# Patient Record
Sex: FEMALE | Race: BLACK OR AFRICAN AMERICAN | NOT HISPANIC OR LATINO | Employment: OTHER | URBAN - METROPOLITAN AREA
[De-identification: names, ages, dates, MRNs, and addresses within clinical notes are randomized per-mention and may not be internally consistent; named-entity substitution may affect disease eponyms.]

---

## 2017-01-11 ENCOUNTER — HOSPITAL ENCOUNTER (OUTPATIENT)
Dept: MRI IMAGING | Facility: CLINIC | Age: 67
Discharge: HOME | End: 2017-01-11
Attending: PSYCHIATRY & NEUROLOGY | Admitting: PSYCHIATRY & NEUROLOGY
Payer: COMMERCIAL

## 2017-01-11 DIAGNOSIS — Z86.73 HISTORY OF STROKE: ICD-10-CM

## 2017-01-11 DIAGNOSIS — G20.C PARKINSONISM, UNSPECIFIED PARKINSONISM TYPE (H): ICD-10-CM

## 2017-01-11 DIAGNOSIS — R26.9 GAIT DISORDER: ICD-10-CM

## 2017-01-11 PROCEDURE — 70551 MRI BRAIN STEM W/O DYE: CPT

## 2017-01-17 ENCOUNTER — OFFICE VISIT (OUTPATIENT)
Dept: NEUROPSYCHOLOGY | Facility: CLINIC | Age: 67
End: 2017-01-17

## 2017-01-17 DIAGNOSIS — G20.C PARKINSONISM, UNSPECIFIED PARKINSONISM TYPE (H): Primary | ICD-10-CM

## 2017-01-17 DIAGNOSIS — F09 MENTAL DISORDER DUE TO GENERAL MEDICAL CONDITION: ICD-10-CM

## 2017-01-17 NOTE — PROGRESS NOTES
The patient was seen for neuropsychological evalution at the request of Dr. Jung Barrera for the purposes of diagnostic clarification and treatment planning. 3 hours of face-to-face testing were provided by this writer. Please see Dr. Faviola Osuna's report for a full interpretation of the findings.

## 2017-02-02 DIAGNOSIS — R41.3 MEMORY LOSS: Primary | ICD-10-CM

## 2017-02-02 DIAGNOSIS — F32.A DEPRESSION, UNSPECIFIED DEPRESSION TYPE: ICD-10-CM

## 2017-02-02 PROBLEM — F09 COGNITIVE DISORDER: Status: ACTIVE | Noted: 2017-02-02
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
## 2017-02-07 NOTE — PROGRESS NOTES
WAIS-IV [place] NAMING TEST     Raw              Age Scaled  Score     46      MAS      5      Vocabulary   33     9  [      45    w/o cues   10    w/phonemic cues]   Block Design        20     6     Coding   17     3  CONTROLLED ORAL WORD ASSOC TEST   Digit Span   24     9  Score     17             MAS     4            SEMANTIC FLUENCY   WIDE RANGE ACHIEVEMENT TEST - 4 Score     28              MAS     6          Standard  %tile               Grade        Score  Rank                Equiv TRAIL MAKING TEST   Reading    95      37           11.6          Seconds               Errors           MAS                   A      59.07   0   5       WECHSLER MEMORY SCALE - REVISED  B    366.06   4   2      Raw Score        MAS           Information & Orientation        11  STROOP   Logical Memory  Immed.   14     5                      Raw   +  Tali =     Total     MAS         Logical Memory  30 min.     8     5   Word  58  +   - =     58      3     Visual Reprod Imm.   18     3   Color  40  +   - =     40     3     Visual Reprod 30 min.     2     2   C-W  16  +   - =     16      3     30 Minute Recognition     1      [place] CARD SORTING TEST   GOLD AUDITORY VERBAL LEARNING TEST # Categories  2            11-16  %ile   (30 item recognition) % persev err.       12         T        43         23 %ile     I   II III  IV  V VI  VII       3   4        4            5         5       3            2 GAMBOA JUDGMENT OF LINE ORIENTATION    Form  H    30 Minute Recall  1  MAS   5  Raw Score         15          MAS          7   30 Minute Recognition         5         MAS   2            Intrusions  3  DEMENTIA RATING SCALE - 2       Learning Efficiency      <65  % Ret.      20   Raw MAS Raw     MAS                                                                      MAS        5 Attention  37    13  Concept   35     8    GOLD COMPLEX FIGURE TEST Init/Psv  31     6  Memory   16     2     Raw Score   T-score %tile Construct     6   10  Total  125/144    5    Copy   28      --        2-5      GERIATRIC DEPRESSION SCALE = 16   [facility] = [place] Older Adult Normative Study Age Adj. Scaled Score

## 2017-02-27 ENCOUNTER — TELEPHONE (OUTPATIENT)
Dept: NEUROLOGY | Facility: CLINIC | Age: 67
End: 2017-02-27

## 2017-02-27 NOTE — TELEPHONE ENCOUNTER
"Called the Psychiatry clinic ([phone]) to discuss how to get this patient seen in their clinic. Referral was placed back on 12/6/16 and 2/2/17 and it does not appear that the patient has been contacted. Spoke to \"Kassy\". On 12/8 the psychiatry clinic LVM on patient's phone to call them back. Patient has not followed up. Nurse Practitioners can see patient within 2-3 weeks, residents are booking out 3 months.    Called patient and LVM to call me back to discuss the referrals Dr. Barrera placed when he saw her in December.      "

## 2017-03-27 ENCOUNTER — OFFICE VISIT (OUTPATIENT)
Dept: NEUROLOGY | Facility: CLINIC | Age: 67
End: 2017-03-27

## 2017-03-27 VITALS
DIASTOLIC BLOOD PRESSURE: 76 MMHG | RESPIRATION RATE: 16 BRPM | WEIGHT: 174 LBS | SYSTOLIC BLOOD PRESSURE: 154 MMHG | HEIGHT: 64 IN | OXYGEN SATURATION: 96 % | BODY MASS INDEX: 29.71 KG/M2 | HEART RATE: 58 BPM

## 2017-03-27 DIAGNOSIS — F03.A0 MILD DEMENTIA (H): ICD-10-CM

## 2017-03-27 DIAGNOSIS — R26.89 BALANCE PROBLEMS: ICD-10-CM

## 2017-03-27 DIAGNOSIS — R26.9 GAIT DIFFICULTY: Primary | ICD-10-CM

## 2017-03-27 RX ORDER — DONEPEZIL HYDROCHLORIDE 5 MG/1
TABLET, FILM COATED ORAL
Qty: 60 TABLET | Refills: 11 | Status: SHIPPED | OUTPATIENT
Start: 2017-03-27 | End: 2017-09-01 | Stop reason: DRUGHIGH

## 2017-03-27 ASSESSMENT — PAIN SCALES - GENERAL: PAINLEVEL: EXTREME PAIN (8)

## 2017-03-27 NOTE — MR AVS SNAPSHOT
After Visit Summary   3/27/2017    Hannah Choudhary    MRN: [id number]           Patient Information     Date Of Birth          1950        Visit Information        Provider Department      3/27/2017 11:40 AM Grey Martinez APRN CNP [facility] Neurology        Today's Diagnoses     Gait difficulty    -  1    Balance problems        Mild dementia          Care Instructions    March 27, 2017    Dear Ms. Hannah Choudhary,    Thank you for coming today.  During your visit, we have discussed the following:     __  Occupational Therapy to do Home assessment for safety & possible grab bars.   __  Consider psychiatry & psychology care.  Per your choice, will not put a referral.   __  Your dopamine scan was NORMAL.  It didn't show Parkinsonian Condition.   __  Your Brain MRI was unchanged.  No abnormalities.   __  Your Neuropsychological evaluation shows dementia.   __  For dementia, start taking Aricept 5 mg 1 tablet daily for 1 month, then 2 tablets.  Monitor for stomach problems like nausea, diarrhea, or vomiting.   __  Return in 4 - 5 months. You may return sooner as needed.      For questions, you may send us a [employer] message or call [phone]    Fax number: [phone]    GREG Chaney CNP  [facility]         Follow-ups after your visit        Additional Services     OCCUPATIONAL THERAPY REFERRAL       House safety assessment.                  Your next 10 appointments already scheduled     Aug 29, 2017 11:15 AM CDT   (Arrive by 11:00 AM)   Return Movement Disorder with GREG Alvarado CNP   [facility] Neurology ([facility])    [address] [place] [address] [phone]              Who to contact     Please call your clinic at [phone] to:    Ask questions about your health    Make or cancel appointments    Discuss your medicines    Learn about your test results    Speak to your doctor   If you have  "compliments or concerns about an experience at your clinic, or if you wish to file a complaint, please contact [facility] Patient Relations at [phone] or email us at [email or web address]         Additional Information About Your Visit        [employer] Information     Comviva is an electronic gateway that provides easy, online access to your medical records. With Comviva, you can request a clinic appointment, read your test results, renew a prescription or communicate with your care team.     To sign up for Comviva visit the website at [email or web address]   You will be asked to enter the access code listed below, as well as some personal information. Please follow the directions to create your username and password.     Your access code is: [email or web address]  Expires: 2017  7:31 AM     Your access code will  in 90 days. If you need help or a new code, please contact your [facility] or call [phone] for assistance.        Care EveryWhere ID     This is your Care EveryWhere ID. This could be used by other organizations to access your [place] medical records  [id number]        Your Vitals Were     Pulse Respirations Height Pulse Oximetry Breastfeeding? BMI (Body Mass Index)    58 16 1.626 m (5' 4\") 96% No 29.87 kg/m2       Blood Pressure from Last 3 Encounters:   17 154/76   16 152/79    Weight from Last 3 Encounters:   17 78.9 kg (174 lb)              We Performed the Following     OCCUPATIONAL THERAPY REFERRAL          Today's Medication Changes          These changes are accurate as of: 3/27/17 12:44 PM.  If you have any questions, ask your nurse or doctor.               Start taking these medicines.        Dose/Directions    donepezil 5 MG tablet   Commonly known as:  ARICEPT   Used for:  Mild dementia   Started by:  Grey Martinez APRN CNP        Take 5 mg/day -- 1 tablet daily for 1 month, then " increase dose 10 mg/day (2 tablets) -   Quantity:  60 tablet   Refills:  11            Where to get your medicines      These medications were sent to [facility] - [place], [place] - [address] AT [facility] & [address]  [address], [facility] [address]     Phone:  [phone]     donepezil 5 MG tablet                Primary Care Provider Office Phone # Fax #    Niurka MASSEY Cameron [phone]       [facility] [address] [facility] [address]        Thank you!     Thank you for choosing [facility] NEUROLOGY  for your care. Our goal is always to provide you with excellent care. Hearing back from our patients is one way we can continue to improve our services. Please take a few minutes to complete the written survey that you may receive in the mail after your visit with us. Thank you!             Your Updated Medication List - Protect others around you: Learn how to safely use, store and throw away your medicines at www.disposemymeds.org.          This list is accurate as of: 3/27/17 12:44 PM.  Always use your most recent med list.                   Brand Name Dispense Instructions for use    acetaminophen 500 MG tablet    TYLENOL         * albuterol (2.5 MG/3ML) 0.083% neb solution     360 mL    Take 1 vial (2.5 mg) by nebulization every 6 hours as needed for shortness of breath / dyspnea or wheezing       * albuterol 108 (90 BASE) MCG/ACT Inhaler   Generic drug:  albuterol      Inhale 2 puffs into the lungs every 6 hours       aspirin 81 MG chewable tablet     108 tablet    Take 1 tablet (81 mg) by mouth daily       benzoyl peroxide 10 % topical gel      Apply topically daily       * TRUEPLUS LANCETS 28G Misc      1 each       * blood glucose monitoring lancets          cetirizine 10 MG tablet    zyrTEC    30 tablet    Take 1 tablet (10 mg) by mouth every evening       clonazePAM 1 MG tablet    klonoPIN    20 tablet    Take 1 tablet (1  mg) by mouth 2 times daily as needed for anxiety       COMPRESSION STOCKINGS          donepezil 5 MG tablet    ARICEPT    60 tablet    Take 5 mg/day -- 1 tablet daily for 1 month, then increase dose 10 mg/day (2 tablets) -       GLUCAGON EMERGENCY 1 MG kit   Generic drug:  glucagon      1 mg       HYDROcodone-acetaminophen 7.5-325 MG per tablet    NORCO    20 tablet    maximum  tablet(s) per day       lidocaine 5 % Patch    LIDODERM    30 patch    Place 1 patch onto the skin every 24 hours       lisinopril 10 MG tablet    PRINIVIL/ZESTRIL    30 tablet    Take 1 tablet (10 mg) by mouth daily       metFORMIN 500 MG tablet    GLUCOPHAGE    60 tablet    Take 1 tablet (500 mg) by mouth daily (with breakfast)       MEVACOR 20 MG tablet   Generic drug:  lovastatin     30 tablet    Take 1 tablet (20 mg) by mouth At Bedtime       * NASONEX 50 MCG/ACT spray   Generic drug:  mometasone     1 Box    Spray 2 sprays into both nostrils daily       * NASONEX 50 MCG/ACT spray   Generic drug:  mometasone     1 Box    Spray 2 sprays into both nostrils daily       PARoxetine 20 MG tablet    PAXIL    30 tablet    1 in the morning and 2 in the evening       polyethylene glycol powder    MIRALAX/GLYCOLAX     Take 17 g by mouth       ranitidine 150 MG tablet    ZANTAC    60 tablet    Take 1 tablet (150 mg) by mouth 2 times daily       senna 8.6 MG tablet    SENOKOT    120 tablet    Take 3 tablets by mouth At Bedtime       traZODone 50 MG tablet    DESYREL    90 tablet    Take 1 tablet (50 mg) by mouth nightly as needed for sleep       TRUE METRIX AIR GLUCOSE METER W/DEVICE Kit          verapamil 240 MG CR tablet    CALAN-SR    30 tablet    Take 1 tablet (240 mg) by mouth daily       * Notice:  This list has 6 medication(s) that are the same as other medications prescribed for you. Read the directions carefully, and ask your doctor or other care provider to review them with you.

## 2017-03-27 NOTE — NURSING NOTE
Chief Complaint   Patient presents with     Consult     [facility]- MOVEMENT DISORDER, SEEN BY MARTIN BEFORE

## 2017-03-27 NOTE — PATIENT INSTRUCTIONS
March 27, 2017    Dear Ms. Hannah CUELLAR Kenrick,    Thank you for coming today.  During your visit, we have discussed the following:     __  Occupational Therapy to do Home assessment for safety & possible grab bars.   __  Consider psychiatry & psychology care.  Per your choice, will not put a referral.   __  Your dopamine scan was NORMAL.  It didn't show Parkinsonian Condition.   __  Your Brain MRI was unchanged.  No abnormalities.   __  Your Neuropsychological evaluation shows dementia.   __  For dementia, start taking Aricept 5 mg 1 tablet daily for 1 month, then 2 tablets.  Monitor for stomach problems like nausea, diarrhea, or vomiting.   __  Return in 4 - 5 months. You may return sooner as needed.      For questions, you may send us a [employer] message or call [phone]    Fax number: [phone]    GREG Chaney, CNP  [facility]

## 2017-08-29 ENCOUNTER — ALLIED HEALTH/NURSE VISIT (OUTPATIENT)
Dept: NEUROLOGY | Facility: CLINIC | Age: 67
End: 2017-08-29

## 2017-08-29 ENCOUNTER — OFFICE VISIT (OUTPATIENT)
Dept: NEUROLOGY | Facility: CLINIC | Age: 67
End: 2017-08-29

## 2017-08-29 ENCOUNTER — TELEPHONE (OUTPATIENT)
Dept: NEUROLOGY | Facility: CLINIC | Age: 67
End: 2017-08-29

## 2017-08-29 VITALS
RESPIRATION RATE: 20 BRPM | SYSTOLIC BLOOD PRESSURE: 134 MMHG | HEART RATE: 55 BPM | HEIGHT: 64 IN | WEIGHT: 165.8 LBS | DIASTOLIC BLOOD PRESSURE: 75 MMHG | BODY MASS INDEX: 28.31 KG/M2 | TEMPERATURE: 98.1 F | OXYGEN SATURATION: 96 %

## 2017-08-29 DIAGNOSIS — R26.9 GAIT DIFFICULTY: Primary | ICD-10-CM

## 2017-08-29 DIAGNOSIS — F03.A0 MILD DEMENTIA (H): ICD-10-CM

## 2017-08-29 DIAGNOSIS — Z71.9 VISIT FOR COUNSELING: Primary | ICD-10-CM

## 2017-08-29 DIAGNOSIS — R26.89 BALANCE PROBLEMS: ICD-10-CM

## 2017-08-29 ASSESSMENT — PAIN SCALES - GENERAL: PAINLEVEL: EXTREME PAIN (8)

## 2017-08-29 NOTE — TELEPHONE ENCOUNTER
Called Mr. Choudhary's daughter, Gaby to update her.  She is aware that her mother has dementia.  She is in the process of taking over paying her mother's bills & having access to her SS money.  Once this is set up, she plans to have her mother's groceries deliver to the house & consider meals on wheels . . .    She was given contact information on Felicitas Lopez, .  She'll fax her Health care directives.

## 2017-08-29 NOTE — MR AVS SNAPSHOT
After Visit Summary   2017    Hannah Choudhary    MRN: [id number]           Patient Information     Date Of Birth          1950        Visit Information        Provider Department      2017 1:39 PM Felicitas Lopez LICSW M [facility]        Today's Diagnoses     Visit for counseling    -  1       Follow-ups after your visit        Your next 10 appointments already scheduled     Mar 06, 2018 11:15 AM CST   (Arrive by 11:00 AM)   Return Movement Disorder with GREG Alvarado CNP [facility] ([facility] [place])    [address] [place] [address] [phone]              Who to contact     Please call your clinic at [phone] to:    Ask questions about your health    Make or cancel appointments    Discuss your medicines    Learn about your test results    Speak to your doctor   If you have compliments or concerns about an experience at your clinic, or if you wish to file a complaint, please contact [facility] Patient Relations at [phone] or email us at [email or web address]         Additional Information About Your Visit        MyChart Information     Dobleast is an electronic gateway that provides easy, online access to your medical records. With [employer], you can request a clinic appointment, read your test results, renew a prescription or communicate with your care team.     To sign up for Dobleast visit the website at [email or web address]   You will be asked to enter the access code listed below, as well as some personal information. Please follow the directions to create your username and password.     Your access code is: [email or web address]  Expires: 2017  6:30 AM     Your access code will  in 90 days. If you need help or a new code, please contact your [facility] or call [phone] for assistance.        Care EveryWhere ID      This is your Care EveryWhere ID. This could be used by other organizations to access your [place] medical records  [id number]         Blood Pressure from Last 3 Encounters:   08/29/17 134/75   03/27/17 154/76   12/06/16 152/79    Weight from Last 3 Encounters:   08/29/17 75.2 kg (165 lb 12.8 oz)   03/27/17 78.9 kg (174 lb)              Today, you had the following     No orders found for display       Primary Care Provider Office Phone # Fax #    Niurka Barnes [phone]       [facility] [address] [facility] [address]        Equal Access to Services     [facility] Hospital[facility] : Hadii ozhaib choi haddominiko Sokristie, waaxda luqadaha, qaybta kaalmada adeyamilayada, yung cruz . So [facility] [phone].    ATENCIÓN: Si habla español, tiene a harris disposición servicios gratuitos de asistencia lingüística. Llame al [phone].    We comply with applicable federal civil rights laws and [place] laws. We do not discriminate on the basis of race, color, national origin, age, disability sex, sexual orientation or gender identity.            Thank you!     Thank you for choosing [facility] NEUROLOGY  for your care. Our goal is always to provide you with excellent care. Hearing back from our patients is one way we can continue to improve our services. Please take a few minutes to complete the written survey that you may receive in the mail after your visit with us. Thank you!             Your Updated Medication List - Protect others around you: Learn how to safely use, store and throw away your medicines at www.disposemymeds.org.          This list is accurate as of: 8/29/17  1:58 PM.  Always use your most recent med list.                   Brand Name Dispense Instructions for use Diagnosis    acetaminophen 500 MG tablet    TYLENOL          * albuterol (2.5 MG/3ML) 0.083% neb solution     360 mL    Take 1 vial (2.5 mg) by nebulization every 6 hours as needed for shortness of  breath / dyspnea or wheezing        * PROAIR  (90 BASE) MCG/ACT Inhaler   Generic drug:  albuterol      Inhale 2 puffs into the lungs every 6 hours        aspirin 81 MG chewable tablet     108 tablet    Take 1 tablet (81 mg) by mouth daily        benzoyl peroxide 10 % topical gel      Apply topically daily        * TRUEPLUS LANCETS 28G Misc      1 each        * blood glucose monitoring lancets           cetirizine 10 MG tablet    zyrTEC    30 tablet    Take 1 tablet (10 mg) by mouth every evening        clonazePAM 1 MG tablet    klonoPIN    20 tablet    Take 1 tablet (1 mg) by mouth 2 times daily as needed for anxiety        COMPRESSION STOCKINGS           donepezil 5 MG tablet    ARICEPT    60 tablet    Take 5 mg/day -- 1 tablet daily for 1 month, then increase dose 10 mg/day (2 tablets) -    Mild dementia       GLUCAGON EMERGENCY 1 MG kit   Generic drug:  glucagon      1 mg        HYDROcodone-acetaminophen 7.5-325 MG per tablet    NORCO    20 tablet    maximum  tablet(s) per day        lidocaine 5 % Patch    LIDODERM    30 patch    Place 1 patch onto the skin every 24 hours        lisinopril 10 MG tablet    PRINIVIL/ZESTRIL    30 tablet    Take 1 tablet (10 mg) by mouth daily        metFORMIN 500 MG tablet    GLUCOPHAGE    60 tablet    Take 1 tablet (500 mg) by mouth daily (with breakfast)        MEVACOR 20 MG tablet   Generic drug:  lovastatin     30 tablet    Take 1 tablet (20 mg) by mouth At Bedtime        * NASONEX 50 MCG/ACT spray   Generic drug:  mometasone     1 Box    Spray 2 sprays into both nostrils daily        * NASONEX 50 MCG/ACT spray   Generic drug:  mometasone     1 Box    Spray 2 sprays into both nostrils daily        PARoxetine 20 MG tablet    PAXIL    30 tablet    1 in the morning and 2 in the evening        polyethylene glycol powder    MIRALAX/GLYCOLAX     Take 17 g by mouth        ranitidine 150 MG tablet    ZANTAC    60 tablet    Take 1 tablet (150 mg) by mouth 2 times daily         senna 8.6 MG tablet    SENOKOT    120 tablet    Take 3 tablets by mouth At Bedtime        traZODone 50 MG tablet    DESYREL    90 tablet    Take 1 tablet (50 mg) by mouth nightly as needed for sleep        TRUE METRIX AIR GLUCOSE METER W/DEVICE Kit           verapamil 240 MG CR tablet    CALAN-SR    30 tablet    Take 1 tablet (240 mg) by mouth daily        * Notice:  This list has 6 medication(s) that are the same as other medications prescribed for you. Read the directions carefully, and ask your doctor or other care provider to review them with you.

## 2017-08-29 NOTE — PROGRESS NOTES
"PATIENT: Hannah Choudhary    : 1950    LIZZIE: 2017    REASON FOR VISIT: Follow up for dementia after Aricept trial.     HPI: Ms. Hannah Choudhary is a 67 year old  female who came to the [facility] neurology clinic by herself for a follow up visit.  I saw her last in the clinic on 3/27/2017 to discuss her workup.  During that visit, the following were discussed: -    ASSESSMENT AND PLAN:        1.  Gait and balance problems.  Patient has a history of gait and balance problems with a possible diagnosis of parkinsonism since 9044-2256.         __  Her DaTscan scan was reviewed, which showed normal bilateral dopamine uptake in the striate.  No dopaminergic deficit was shown which shows no parkinsonian condition.  This was discussed with the patient.  She was surprised & reports \"you are the first one who told me this.\"  Records show that the providers that she had seen at [facility] had told her that she may not have parkinsonian condition, which she wasn't mckinley to recall.   __  Her MRI result was discussed with patient, which did not show any acute intracranial abnormality.  She has some chronic small vessel ischemic disease which is stable compared to 2015 MRI.   __  Since she is at risk of falls, I recommended OT home evaluation and assessment to see if she could get more grab bars to prevent falls which she agreed.  Referral was placed in Epic.   __  Discussed about physical therapy to work on gait and balance.  She declined as she is careful when walking and likes to walk at [facility].       2.  Mild dementia.  Her neuropsych evaluation was reviewed with the patient which showed mild dementia and a global cerebral involvement.  It also indicated that she is experiencing moderate  depression.        __  There is a recommendation to involve Psychiatry and Psychology, which was discussed with the patient.  She declined any help from Psychiatry or Psychology.  \"I know my problems & stressors that " "depresses me.\"   She reports it would not do anything to talk about it with somebody else.  She reports getting support from her 1st .  She is on antidepressants which is managed by her primary care provider.   __  Discussed about starting treatment for dementia.  I recommended starting Aricept.  After reviewing benefits and side effects, patient was a little leery about diarrhea.  However, since this is dose dependent, she opted to try it.                            A prescription for Aricept was sent for 5 mg 1 tablet daily for 1 month and 10 mg (2 tablets) daily.                            She was encouraged to monitor for GI symptoms.     Today, when asked if she started taking Aricept, she said, \"I'm not sure. I don't remember.\"  To get the information, she first called her PCP clinic to ask for her medication list.  When she was told that Aricept was prescribed from this clinic & is on her list, she then called her pharmacist to ask if she has picked up the Rx. She was told that she has picked it up.  She doesn't recall if she is taking Aricept 5 mg 1 or 2 tables per day.  She also doesn't recall if she is taking it in the morning or evening.  She asked if I know the code on the pill bottle for Aricept, if it's \"M\" or \"G\".  She reports that the code helps her to figure out if she's taking it once or twice.  She called her nurse who sets up her pills who told her that she takes Aricept 10 mg daily.  She denies side effects.       She reports having low back pain & pain in her knees & feet, which affect her walking.  She is taking Vicodin 7.5 mg 4 x daily for pain, but it's not helping her.  She had 2 falls since her last visit, which exacerbates her pain.  She lives by herself in a senior living apartment.  She reports difficulty cleaning her house.  Her daughter came & helped her clean.  She needs help sorting out her mails & paying bills.  She doesn't have a  PCA.  She has had PCAs come to her house in " the past, but she reports that they stole her pills.  She has alternative care through [facility].  She doesn't want to go to assisted living.  She wants to get help, but is concerned about PCA's not doing what they're supposed to do.     MEDICATIONS:   Medication Sig     donepezil (ARICEPT) 10 MG tablet Take 1 tablet daily.     albuterol (2.5 MG/3ML) 0.083% neb solution Take 1 vial (2.5 mg) by nebulization every 6 hours as needed for shortness of breath / dyspnea or wheezing     albuterol (ALBUTEROL) 108 (90 BASE) MCG/ACT Inhaler Inhale 2 puffs into the lungs every 6 hours     aspirin 81 MG chewable tablet Take 1 tablet (81 mg) by mouth daily     cetirizine (ZYRTEC) 10 MG tablet Take 1 tablet (10 mg) by mouth every evening     lisinopril (PRINIVIL/ZESTRIL) 10 MG tablet Take 1 tablet (10 mg) by mouth daily     lovastatin (MEVACOR) 20 MG tablet Take 1 tablet (20 mg) by mouth At Bedtime     mometasone (NASONEX) 50 MCG/ACT spray Spray 2 sprays into both nostrils daily     ranitidine (ZANTAC) 150 MG tablet Take 1 tablet (150 mg) by mouth 2 times daily     traZODone (DESYREL) 50 MG tablet Take 1 tablet (50 mg) by mouth nightly as needed for sleep     verapamil (CALAN-SR) 240 MG CR tablet Take 1 tablet (240 mg) by mouth daily     COMPRESSION STOCKINGS      blood glucose monitoring (ACCU-CHEK FASTCLIX) lancets      polyethylene glycol (MIRALAX/GLYCOLAX) powder Take 17 g by mouth     Blood Glucose Monitoring Suppl (TRUE METRIX AIR GLUCOSE METER) W/DEVICE KIT      glucagon (GLUCAGON EMERGENCY) 1 MG kit 1 mg     TRUEPLUS LANCETS 28G MISC 1 each     clonazePAM (KLONOPIN) 1 MG tablet Take 1 tablet (1 mg) by mouth 2 times daily as needed for anxiety     PARoxetine (PAXIL) 20 MG tablet 1 in the morning and 2 in the evening     senna (SENOKOT) 8.6 MG tablet Take 3 tablets by mouth At Bedtime     lidocaine (LIDODERM) 5 % Patch Place 1 patch onto the skin every 24 hours     metFORMIN (GLUCOPHAGE) 500 MG tablet Take 1 tablet  "(500 mg) by mouth daily (with breakfast)     acetaminophen (TYLENOL) 500 MG tablet      benzoyl peroxide 10 % topical gel Apply topically daily     mometasone (NASONEX) 50 MCG/ACT spray Spray 2 sprays into both nostrils daily     HYDROcodone-acetaminophen (NORCO) 7.5-325 MG per tablet maximum  tablet(s) per day       ALLERGIES: IVP dye [contrast dye]; Latex; No clinical screening - see comments; Seafood; Shellfish allergy; and Shellfish-derived products    PHYSICAL EXAM:    VITAL SIGNS:  Blood pressure 134/75, pulse 55, temperature 98.1  F (36.7  C), resp. rate 20, height 1.626 m (5' 4\"), weight 75.2 kg (165 lb 12.8 oz), SpO2 96 %.   Body mass index is 28.46 kg/(m^2).    GENERAL:  Ms. Choudhary is a pleasant  female who is well-groomed and well-developed sitting comfortably in the exam room without any distress.  Affect is appropriate.    ASSESSMENT/PLAN:    Mild dementia:  As mentioned above, pt is having difficulty remembering details.  Surprisingly, she is able to call the right people & get the information asked.  Since she's been on Aricept.  It's hard to assess improvement in memory/cognition.  She is tolerating the dose at this time.    __  Felicitas Biggs,  was paged to provide pt with resources.  __  Will continue with Aricept.    Gait & balance difficulty:  This is an ongoing issue.  She uses a 4-wheeled walker.  Due to cognitive difficulty, unsure if she would benefit from PT.  Will hold off on this for now.    Will return to our clinic in 4 - 6 months or sooner as needed.    The total time spent with the patient was 45 minutes, and greater than 50% of this time was spent in counseling and coordination of care.    GREG Chaney,  CNP  [facility]    "

## 2017-08-29 NOTE — PROGRESS NOTES
Social Work:  D/I: Request from Vanita Martinez NP to meet w/Pt in Neurology clinic. Pt w/new dx dementia and reports she needs some housekeeping services at home. She lives alone in her own Sr apt w/elevator. She has services thru [facility] and her  is Marilyn Colunga. Pt uses both her [place] and her cell phone to help her memory and reach her contact people. She has children in the area but she cannot rely upon them for assistance. She does have a dtr in [place], Gaby Knott [phone] that she trusts. She is trying to get her Soc [employer] Rep Payee changed from [employer] to [employer] because she doesn't like that they don't give her frequent renderings of how her money is being spent. She indicated that she believes Gaby is also her health care agent/decision maker. She provided Gaby's contact info and signed a verbal LISBETH so we can contact her. Vanita plans to do that.  Pt has a weekly visiting nurse Monica Casey [phone]. She sets up her meds month in a am and pm med box. She said that she remembers to take them 90% of the time. Sometimes she forgets her am meds but takes them when she remembers. She had home delivered meals at one time but didn't like them. She feels she needs help with housekeeping now. She doesn't want help with showering or other personal care. They are installing a hand held shower and a new shower chair. There is also a motorized scooter on order.   Pt has food support and limited MA. She pays a copay for her home services. She states that she has difficulty making ends meet. She indicated she gets $36/week for spending. I will provide a gift card for [employer] w/funds from the [employer] today.  In addition to her [facility] , she has a  that visits her regularly from [place] [phone]. She uses [employer] mobility to get to her appts. Her X husb gave her $ today to pay for metro mobility. She  soon will be getting a GoTo card for metro mob from her .  A/P: Pt answered questions appropriately. When asked about how she is coping with the memory loss she indicated that the neuropathy and arthritis are more difficult to deal with than the memory. She appears to be pretty well supported in the community with her , home nurse and contacts with her dtr. Will place a call to Marilyn Colunga and Vanita plans to call Pt's dtr to coordinate care.   Pt has my contact info if further needs.

## 2017-08-29 NOTE — PATIENT INSTRUCTIONS
August 29, 2017    Dear Ms. Hannah Choudhary,    Thank you for coming today.  During your visit, we have discussed the following:     __  Continue taking Aricept 10 mg daily.   __  Continue using walker all the time.   __  Call Felicitas Biggs, our  to get more resources in getting help around the house.   __  Return in 6 months. You may return sooner as needed.      For questions, you may send us a [employer] message or call [phone]    Fax number: [phone]    GREG Chaney, CNP  [facility]

## 2017-08-29 NOTE — NURSING NOTE
Chief Complaint   Patient presents with     RECHECK     UMP- MOVEMENT DISORDER, 5 MONTHS F/U     Josef Romero, CMA

## 2017-09-01 RX ORDER — DONEPEZIL HYDROCHLORIDE 10 MG/1
10 TABLET, FILM COATED ORAL DAILY
Qty: 30 TABLET | Refills: 1 | COMMUNITY
Start: 2017-09-01

## 2020-07-13 NOTE — PROGRESS NOTES
PATIENT: Hannah Choudhary    : 1950    LIZZIE: 2017    REASON FOR VISIT:  Follow up for gait disorder and to discuss workup results.      HISTORY OF PRESENT ILLNESS:  Ms. Hannah Choudhary is a 66-year-old -American female who came to the clinic by herself for a follow up visit.  She was last seen in clinic on 2016 by Dr. Barrera for evaluation of parkinsonian condition.  During that visit the following were discussed : -    PLAN  1. Brain mri   2. Dopamine scan  3. Neuropsychological evaluation  4. Psychiatry referral  5. Physical therapy referral  6. Return to see Vanita Martinez  7. No parkinsonian medications for now.   8. Consider EMG down the road to document neuropathy  9. Consider psychology consultation.       Jung Barrera MD     The patient reports that her gait difficulty started around 8497-2922.  She was seen at [facility] in [place] and was given a diagnosis of parkinsonism.  She has been seen by multiple providers.  She was seen in [facility] who recommended doing a DaTscan scan back in .  She is not sure if it was done or what the results were.  She was on dopamine agonist which was stopped.  She reports having gait and balance problems.  She uses a 4-wheeled walker.   She reports having 2 bad falls.  The last one was about 2 weeks ago.  Because she is very cautious and holds onto a wall or grabs on various things in the house to prevent falling.        MEDICATIONS:   Medication Sig     albuterol (2.5 MG/3ML) 0.083% neb solution Take 1 vial (2.5 mg) by nebulization every 6 hours as needed for shortness of breath / dyspnea or wheezing     albuterol (ALBUTEROL) 108 (90 BASE) MCG/ACT Inhaler Inhale 2 puffs into the lungs every 6 hours     aspirin 81 MG chewable tablet Take 1 tablet (81 mg) by mouth daily     cetirizine (ZYRTEC) 10 MG tablet Take 1 tablet (10 mg) by mouth every evening     lisinopril (PRINIVIL/ZESTRIL) 10 MG tablet Take 1 tablet (10 mg) by mouth daily      "lovastatin (MEVACOR) 20 MG tablet Take 1 tablet (20 mg) by mouth At Bedtime     mometasone (NASONEX) 50 MCG/ACT spray Spray 2 sprays into both nostrils daily     ranitidine (ZANTAC) 150 MG tablet Take 1 tablet (150 mg) by mouth 2 times daily     traZODone (DESYREL) 50 MG tablet Take 1 tablet (50 mg) by mouth nightly as needed for sleep     verapamil (CALAN-SR) 240 MG CR tablet Take 1 tablet (240 mg) by mouth daily     COMPRESSION STOCKINGS      blood glucose monitoring (ACCU-CHEK FASTCLIX) lancets      polyethylene glycol (MIRALAX/GLYCOLAX) powder Take 17 g by mouth     rOPINIRole (REQUIP) 2 MG tablet One am, two hs     Blood Glucose Monitoring Suppl (TRUE METRIX AIR GLUCOSE METER) W/DEVICE KIT      glucagon (GLUCAGON EMERGENCY) 1 MG kit 1 mg     TRUEPLUS LANCETS 28G MISC 1 each     clonazePAM (KLONOPIN) 1 MG tablet Take 1 tablet (1 mg) by mouth 2 times daily as needed for anxiety     PARoxetine (PAXIL) 20 MG tablet 1 in the morning and 2 in the evening     senna (SENOKOT) 8.6 MG tablet Take 3 tablets by mouth At Bedtime     lidocaine (LIDODERM) 5 % Patch Place 1 patch onto the skin every 24 hours     metFORMIN (GLUCOPHAGE) 500 MG tablet Take 1 tablet (500 mg) by mouth daily (with breakfast)     acetaminophen (TYLENOL) 500 MG tablet      benzoyl peroxide 10 % topical gel Apply topically daily     mometasone (NASONEX) 50 MCG/ACT spray Spray 2 sprays into both nostrils daily     HYDROcodone-acetaminophen (NORCO) 7.5-325 MG per tablet maximum  tablet(s) per day       ALLERGIES: IVP dye [contrast dye]; Latex; No clinical screening - see comments; Shellfish allergy; and Shellfish-derived products    PHYSICAL EXAM:    VITAL SIGNS:  Blood pressure 154/76, pulse 58, resp. rate 16, height 1.626 m (5' 4\"), weight 78.9 kg (174 lb), SpO2 96 %. Body mass index is 29.87 kg/(m^2).    GENERAL:  Ms. Choudhary is a pleasant -American female who is well-groomed and well-developed, sitting comfortably in the exam room without any " "distress.  Affect is appropriate.   She had difficulty remembering details about her medical history.     MOVEMENT DISORDERS ASSESSMENT:  Speech is with normal expression and volume.  She has slight voice & chin tremor when she talks.  No abnormal facial expression.  No rest tremor in all extremities.  No action or postural tremor bilaterally.  No rigidity in all extremities.  Finger tapping, hand opening and closing, hand pronation and supination and leg agility show mild slowing bilaterally.  She was able to arise from a chair by pushing herself up from arms of seat.  Posture is erect.  Gait is slow with normal steps.  She has reduced arm swing bilaterally.  Postural stability shows absence of postural response and would fall if not caught by examiner.  She has mild body bradykinesia.        ASSESSMENT AND PLAN:       1.  Gait and balance problems.  Patient has a history of gait and balance problems with a possible diagnosis of parkinsonism since 8956-5936.        __  Her DaTscan scan was reviewed, which showed normal bilateral dopamine uptake in the striate.  No dopaminergic deficit was shown which shows no parkinsonian condition.  This was discussed with the patient.  She was surprised & reports \"you are the first one who told me this.\"  Records show that the providers that she had seen at [facility] had told her that she may not have parkinsonian condition, which she wasn't mckinley to recall.   __  Her MRI result was discussed with patient, which did not show any acute intracranial abnormality.  She has some chronic small vessel ischemic disease which is stable compared to 08/2015 MRI.   __  Since she is at risk of falls, I recommended OT home evaluation and assessment to see if she could get more grab bars to prevent falls which she agreed.  Referral was placed in Epic.   __  Discussed about physical therapy to work on gait and balance.  She declined as she is careful when walking and likes to walk at [facility].      2. " " Mild dementia.  Her neuropsych evaluation was reviewed with the patient which showed mild dementia and a global cerebral involvement.  It also indicated that she is experiencing moderate  depression.       __  There is a recommendation to involve Psychiatry and Psychology, which was discussed with the patient.  She declined any help from Psychiatry or Psychology.  \"I know my problems & stressors that depresses me.\"   She reports it would not do anything to talk about it with somebody else.  She reports getting support from her 1st .  She is on antidepressants which is managed by her primary care provider.   __  Discussed about starting treatment for dementia.  I recommended starting Aricept.  After reviewing benefits and side effects, patient was a little leery about diarrhea.  However, since this is dose dependent, she opted to try it.     A prescription for Aricept was sent for 5 mg 1 tablet daily for 1 month and 10 mg (2 tablets) daily.     She was encouraged to monitor for GI symptoms.      The total time spent with the patient was 70 minutes, greater than 50% of the time was spent in counseling and coordination of care.      GREG Chaney, CNP  [facility]           D: 2017 12:55   T: 2017 13:37   MT: nh      Name:     JAMIE BATES   MRN:      [id number]        Account:      [id number]   :      1950           Service Date: 2017      Document: [id number]      " no

## 2024-07-24 ENCOUNTER — TRANSFERRED RECORDS (OUTPATIENT)
Dept: HEALTH INFORMATION MANAGEMENT | Facility: CLINIC | Age: 74
End: 2024-07-24
Payer: COMMERCIAL

## 2024-07-24 ENCOUNTER — MEDICAL CORRESPONDENCE (OUTPATIENT)
Dept: HEALTH INFORMATION MANAGEMENT | Facility: CLINIC | Age: 74
End: 2024-07-24
Payer: COMMERCIAL

## 2024-07-25 ENCOUNTER — TRANSCRIBE ORDERS (OUTPATIENT)
Dept: OTHER | Age: 74
End: 2024-07-25

## 2024-07-25 DIAGNOSIS — G89.29 CHRONIC PELVIC PAIN IN FEMALE: Primary | ICD-10-CM

## 2024-07-25 DIAGNOSIS — R10.2 CHRONIC PELVIC PAIN IN FEMALE: Primary | ICD-10-CM

## 2024-08-21 ENCOUNTER — OFFICE VISIT (OUTPATIENT)
Dept: UROLOGY | Facility: CLINIC | Age: 74
End: 2024-08-21
Attending: FAMILY MEDICINE
Payer: COMMERCIAL

## 2024-08-21 VITALS
HEART RATE: 72 BPM | HEIGHT: 64 IN | WEIGHT: 188.1 LBS | DIASTOLIC BLOOD PRESSURE: 84 MMHG | SYSTOLIC BLOOD PRESSURE: 136 MMHG | BODY MASS INDEX: 32.11 KG/M2

## 2024-08-21 DIAGNOSIS — R39.89 BLADDER PAIN: ICD-10-CM

## 2024-08-21 DIAGNOSIS — R31.0 GROSS HEMATURIA: Primary | ICD-10-CM

## 2024-08-21 DIAGNOSIS — R10.2 CHRONIC PELVIC PAIN IN FEMALE: ICD-10-CM

## 2024-08-21 DIAGNOSIS — G89.29 CHRONIC PELVIC PAIN IN FEMALE: ICD-10-CM

## 2024-08-21 LAB
ALBUMIN UR-MCNC: 50 MG/DL
AMORPH CRY #/AREA URNS HPF: ABNORMAL /HPF
APPEARANCE UR: ABNORMAL
BILIRUB UR QL STRIP: NEGATIVE
COLOR UR AUTO: YELLOW
GLUCOSE UR STRIP-MCNC: NEGATIVE MG/DL
HGB UR QL STRIP: ABNORMAL
HYALINE CASTS: 5 /LPF
KETONES UR STRIP-MCNC: NEGATIVE MG/DL
LEUKOCYTE ESTERASE UR QL STRIP: ABNORMAL
MUCOUS THREADS #/AREA URNS LPF: PRESENT /LPF
NITRATE UR QL: NEGATIVE
PH UR STRIP: 5.5 [PH] (ref 5–7)
RBC URINE: >182 /HPF
SP GR UR STRIP: 1.02 (ref 1–1.03)
SQUAMOUS EPITHELIAL: 29 /HPF
TRANSITIONAL EPI: 2 /HPF
UROBILINOGEN UR STRIP-MCNC: NORMAL MG/DL
WBC CLUMPS #/AREA URNS HPF: PRESENT /HPF
WBC URINE: 47 /HPF

## 2024-08-21 PROCEDURE — G0463 HOSPITAL OUTPT CLINIC VISIT: HCPCS | Performed by: OBSTETRICS & GYNECOLOGY

## 2024-08-21 PROCEDURE — 81001 URINALYSIS AUTO W/SCOPE: CPT | Performed by: OBSTETRICS & GYNECOLOGY

## 2024-08-21 PROCEDURE — 99204 OFFICE O/P NEW MOD 45 MIN: CPT | Performed by: OBSTETRICS & GYNECOLOGY

## 2024-08-21 PROCEDURE — 87086 URINE CULTURE/COLONY COUNT: CPT | Performed by: OBSTETRICS & GYNECOLOGY

## 2024-08-21 NOTE — NURSING NOTE
Pt voided 10 ml into a sterile cup.  Pt had a urine hat before but emptied before rooming staff could document amount.  PVR was 19 ml.

## 2024-08-21 NOTE — LETTER
2024       RE: Hannah Choudhary  [address] [place] [address]     Dear Colleague,    Thank you for referring your patient, Hannah Choudhary, to the [facility] [place] [facility]. Please see a copy of my visit note below.    2024    Referring Provider: Bonnie Chaudhry MD  [address] [place] [address]    Primary Care Provider: Bonnie Chaudhry    CC: Hematuria    HPI:  Hannah Choudhary is a 74 year old  with medical hx sig for Parkinsonims, PTSD , cHTN and former Tobacco use who presents for evaluation of gross hematuria.   She says she sees blood in her urine on and off for several months. Her hx is sig for a cystic lesion of left kidney (  a 3.3 cm left superior cortical cystic lesion without evidence of definitive solid enhancement. )  s/p CT guided biopsy in  with benign findings. She says she sometimes have dysuria and low back pain and this comes and goes. Mild dysuria this morning. No fevers/chills/flank pain. No N/V.  Denies stress or urgency leaks. No urgency/frequency but does have nocturia ( voids 2-3 times at night).     Had cystic mass in her left kidney s/p  CT GUIDED NEEDLE BIOPSY on 10.24.23  1. Benign renal parenchyma  2. Negative for neoplasm in this sampling       Xray KUB 7.8.24  IMPRESSION:  No radiopaque kidney stones are evident. Unchanged small bilateral pelvic calcifications likely representing phleboliths.    No dilated bowel loops to suggest bowel obstruction.     EXAM: XR PELVIS & HIP LATERAL BILAT    DATE: 8/10/2023 3:08 PM    COMPARISON: None.    CLINICAL DATA: Pelvic and perineal pain.    VIEWS: A frontal view of the pelvis and lateral views of both hips were obtained.    FINDINGS:    No fracture, subluxation or dislocation is identified. There are mild degenerative changes of both hips with mild joint space narrowing and marginal osteophyte  formation.     EXAM:  MRI FEMALE/GYN PELVIS W/O&W CON DATE:   9/28/2023     FINDINGS:    Hysterectomy. Vaginal cuff is unremarkable in appearance. The vaginal canal is grossly normal in appearance by MRI examination with preserved vesicovaginal fat plane. The left ovary measures 1.1 x 2.4 x 3.0 cm. No left ovarian or adnexal abnormality visualized. The right ovary measures 1.2 x 2.5 x 2.8 cm. No right ovarian or adnexal abnormality visualized.     Grossly negative urinary bladder and urethra. Partially visualized sigmoid diverticulosis without surrounding inflammatory change. Grossly preserved pelvic flow voids. No pelvic lymphadenopathy. Pelvic musculature is symmetric.     There is a T1 and T2 hyperintense focus within the S1 sacral segment which is stable in size and morphology since MRI examination of the lumbar spine 3/15/2016. This likely represents an atypical hemangioma. No current aggressive characteristics. Remainder of the marrow signal throughout the partially visualized bony pelvis is unremarkable. Partially visualized sacral plexus is negative.       Pelvic Organ Prolapse Symptoms  Has some pelvic pressure. But no protrusion or bulge    Gastrointestinal Symptoms:  Denies chronic diarrhea, constipation. Denies bothersome fecal or flatal incontinence. Denies defecatory difficulties.       Sexual function/Pelvic floor pain/GYN:   Sexually active. No pain. No vaginal bleeding. S/p hysterectomy      Relevant Medical History:    Diabetes? no  High Blood pressure? yes     Recurrent UTIs? no  Sleep Apnea? no  Obesity? Body mass index is 32.29 kg/m .  History of Blood clots? no  Other medical problems: as above    Surgical History:      Past Surgical History:   Procedure Laterality Date     HYSTERECTOMY      partial hysterectomy     MAMMOPLASTY REDUCTION      x2     right arm surgery Right     fracture of right arm     TONSILLECTOMY      x2     TUBAL LIGATION         OB/Gyn History:  OB History   No obstetric  history on file.       Medications/Vitamins/Supplements:   Current Outpatient Medications   Medication Sig Dispense Refill     acetaminophen (TYLENOL) 500 MG tablet        albuterol (2.5 MG/3ML) 0.083% neb solution Take 1 vial (2.5 mg) by nebulization every 6 hours as needed for shortness of breath / dyspnea or wheezing 360 mL      albuterol (ALBUTEROL) 108 (90 BASE) MCG/ACT Inhaler Inhale 2 puffs into the lungs every 6 hours       aspirin 81 MG chewable tablet Take 1 tablet (81 mg) by mouth daily 108 tablet 3     benzoyl peroxide 10 % topical gel Apply topically daily       blood glucose monitoring (ACCU-CHEK FASTCLIX) lancets        Blood Glucose Monitoring Suppl (TRUE METRIX AIR GLUCOSE METER) W/DEVICE KIT        cetirizine (ZYRTEC) 10 MG tablet Take 1 tablet (10 mg) by mouth every evening 30 tablet 1     clonazePAM (KLONOPIN) 1 MG tablet Take 1 tablet (1 mg) by mouth 2 times daily as needed for anxiety 20 tablet 0     COMPRESSION STOCKINGS        donepezil (ARICEPT) 10 MG tablet Take 1 tablet (10 mg) by mouth daily 30 tablet 1     glucagon (GLUCAGON EMERGENCY) 1 MG kit 1 mg       HYDROcodone-acetaminophen (NORCO) 7.5-325 MG per tablet maximum  tablet(s) per day 20 tablet 0     lidocaine (LIDODERM) 5 % Patch Place 1 patch onto the skin every 24 hours 30 patch      lisinopril (PRINIVIL/ZESTRIL) 10 MG tablet Take 1 tablet (10 mg) by mouth daily 30 tablet 1     lovastatin (MEVACOR) 20 MG tablet Take 1 tablet (20 mg) by mouth At Bedtime 30 tablet 1     metFORMIN (GLUCOPHAGE) 500 MG tablet Take 1 tablet (500 mg) by mouth daily (with breakfast) 60 tablet      mometasone (NASONEX) 50 MCG/ACT spray Spray 2 sprays into both nostrils daily 1 Box 11     mometasone (NASONEX) 50 MCG/ACT spray Spray 2 sprays into both nostrils daily 1 Box 11     PARoxetine (PAXIL) 20 MG tablet 1 in the morning and 2 in the evening 30 tablet 1     polyethylene glycol (MIRALAX/GLYCOLAX) powder Take 17 g by mouth       ranitidine (ZANTAC) 150 MG  tablet Take 1 tablet (150 mg) by mouth 2 times daily 60 tablet 1     senna (SENOKOT) 8.6 MG tablet Take 3 tablets by mouth At Bedtime 120 tablet      traZODone (DESYREL) 50 MG tablet Take 1 tablet (50 mg) by mouth nightly as needed for sleep 90 tablet 3     TRUEPLUS LANCETS 28G MISC 1 each       verapamil (CALAN-SR) 240 MG CR tablet Take 1 tablet (240 mg) by mouth daily 30 tablet 1     No current facility-administered medications for this visit.         Medical History:      Past Medical History:   Diagnosis Date     Anxiety 12/6/2016     Cataract right eye 12/6/2016     Cognitive disorder 2/2/2017    neuropsych 2017  IMPRESSIONS AND RECOMMENDATIONS  Current results indicate executive dysfunction, including difficulty with planning, organization, perseverations, and conceptualization. Information and psychomotor processing speed are slowed. Visual processing is impaired, and she demonstrates mild anomia. Learning and memory are impaired. She demonstrates a rapid forgetting rate. Basic attention     Concussion 12/5/2016     Depression 12/6/2016     Ear problem, right  - hole in the right ear 12/6/2016     Family history of diabetes mellitus 12/6/2016     Family history of obesity 12/6/2016     Family history of stress 12/6/2016     H/O cardiovascular stress test 12/6/2016 2016 October Outpatient Nuclear Stress Test10/14/2016  [facility]  Outpatient Nuclear Stress Test10/14/2016  [facility]  Result Transcription Jeannie Celis MD - 10/14/2016 11:59 PM CDTPJeannie shaw MD - 10/14/2016 11:59 PM CDT CC: [facility]   REPORT TITLE:  Nuclear Cardiology Final Report  Facility:  [facility]     H/O CT scan of head 12/6/2016    CT Head [facility]-Contrast4/16/2010  [employer] [facility]  Result Impression  1.  No acute findings noncontrast CT of the head.   2.  Appearance suggesting early periventricular small vessel  ischemic  changes left frontal horn region.   Desmond Rosario M.D. Diagnostic/Musculoskeletal Radiologist [employer] SDS/sr D: 04/16/10  T: 04/17/10   Result Narrative CT      H/O magnetic resonance imaging of brain and brain stem 12/6/2016    Mild atrophy, mild white matter disease     H/O magnetic resonance imaging of lumbar spine 12/6/2016    MRI SPINE LUMBAR WO IV CONTRAST3/15/2016  [facility]  Result Impression IMPRESSION:   1.  There is a minimal disc bulging at L1-L2, L3-L4, and L4-L5.  No significant thecal sac or neuroforaminal narrowing and no evidence of nerve or impingement.  Result Narrative EXAM:  MRI LUMBAR SPINE WITHOUT CONTRAST  DATE:  3/15/2016 8:44 PM  ICD 10:                 ADDITIONAL CLINICAL DATA:  L     Headache 12/5/2016     History of MRI of thoracic spine 12/6/2016    MRI SPINE THORACIC W/O CONTR)3/15/2016  [facility]  Result Impression IMPRESSION:   1.  Benign hemangiomas, otherwise negative.  Result Narrative EXAM:  THORACIC SPINE WITHOUT GADOLINIUM  DATE:  3/15/2016 9:00 PM  ICD 10:               ADDITIONAL CLINICAL DATA:  Back pain.    COMPARISON:  None  TECHNIQUE:  Sagittal FLAIR T1, sagittal FSE T2, sagittal fat saturated FSE T2.  FINDINGS     History of stroke 2005 per patient 12/6/2016     Insomnia 12/6/2016     Laboratory test 12/19/2016    Osvaldo Andrew MD Boegel, Kevin H, MD 12/19/2016 12/19/2016           Narrative         Examination: Nuclear medicine DATscan for Dopamine Receptor Localization.    Examination: NM BRAIN IMAGING TOMOGRAPHIC (SPECT) DATSCAN  Date: 12/19/2016 7:46 AM  Indication: Parkinson's disease  Comparison: None  Additional Information: none  Interfering Medications: paroxetine  Technique:  The pat     Memory loss 12/5/2016     Parkinsonism (H) 12/5/2016     Post-trauma syndrome 12/5/2016     Urinary frequency 12/6/2016     Urinary urgency 12/6/2016     ROS  Social History    Social History      Socioeconomic History     Marital status: Single     Spouse name: Not on file     Number of children: Not on file     Years of education: Not on file     Highest education level: Not on file   Occupational History     Not on file   Tobacco Use     Smoking status: Former     Current packs/day: 0.00     Types: Cigarettes     Quit date: 3/27/1987     Years since quittin.4     Smokeless tobacco: Former   Substance and Sexual Activity     Alcohol use: Yes     Alcohol/week: 1.0 - 2.0 standard drink of alcohol     Types: 1 - 2 Standard drinks or equivalent per week     Comment: PER YEAR     Drug use: Not on file     Sexual activity: Not Currently   Other Topics Concern     Parent/sibling w/ CABG, MI or angioplasty before 65F 55M? Not Asked   Social History Narrative    SINGLE. LIVES IN [place].     2 twin daughters    3 sons     Social Determinants of Health     Financial Resource Strain: Not on file   Food Insecurity: Not on file   Transportation Needs: Not on file   Physical Activity: Not on file   Stress: Not on file   Social Connections: Not on file   Interpersonal Safety: Not At Risk (2024)    Received from [facility]     Humiliation, Afraid, Rape, and Kick questionnaire      Fear of Current or Ex-Partner: No      Emotionally Abused: No      Physically Abused: No      Sexually Abused: No   Housing Stability: Not on file       Family History  Family History   Problem Relation Age of Onset     Heart Disease Mother      Liver Cancer Father      Intellectual Disability Son      Obesity Son      Diabetes Son      Hypertension Other      Other - See Comments Daughter         gallbladder       Allergy    Allergies   Allergen Reactions     Ivp Dye [Contrast Dye] Hives     Latex Other (See Comments)     Other reaction(s): Hives  swelling     No Clinical Screening - See Comments      Adhesive Tape, IVP Dye     Seafood      Other reaction(s): Hives     Shellfish Allergy      Shellfish-Derived Products  Hives     Scallops       Current Outpatient Medications   Medication Sig Dispense Refill     acetaminophen (TYLENOL) 500 MG tablet        albuterol (2.5 MG/3ML) 0.083% neb solution Take 1 vial (2.5 mg) by nebulization every 6 hours as needed for shortness of breath / dyspnea or wheezing 360 mL      albuterol (ALBUTEROL) 108 (90 BASE) MCG/ACT Inhaler Inhale 2 puffs into the lungs every 6 hours       aspirin 81 MG chewable tablet Take 1 tablet (81 mg) by mouth daily 108 tablet 3     benzoyl peroxide 10 % topical gel Apply topically daily       blood glucose monitoring (ACCU-CHEK FASTCLIX) lancets        Blood Glucose Monitoring Suppl (TRUE METRIX AIR GLUCOSE METER) W/DEVICE KIT        cetirizine (ZYRTEC) 10 MG tablet Take 1 tablet (10 mg) by mouth every evening 30 tablet 1     clonazePAM (KLONOPIN) 1 MG tablet Take 1 tablet (1 mg) by mouth 2 times daily as needed for anxiety 20 tablet 0     COMPRESSION STOCKINGS        donepezil (ARICEPT) 10 MG tablet Take 1 tablet (10 mg) by mouth daily 30 tablet 1     glucagon (GLUCAGON EMERGENCY) 1 MG kit 1 mg       HYDROcodone-acetaminophen (NORCO) 7.5-325 MG per tablet maximum  tablet(s) per day 20 tablet 0     lidocaine (LIDODERM) 5 % Patch Place 1 patch onto the skin every 24 hours 30 patch      lisinopril (PRINIVIL/ZESTRIL) 10 MG tablet Take 1 tablet (10 mg) by mouth daily 30 tablet 1     lovastatin (MEVACOR) 20 MG tablet Take 1 tablet (20 mg) by mouth At Bedtime 30 tablet 1     metFORMIN (GLUCOPHAGE) 500 MG tablet Take 1 tablet (500 mg) by mouth daily (with breakfast) 60 tablet      mometasone (NASONEX) 50 MCG/ACT spray Spray 2 sprays into both nostrils daily 1 Box 11     mometasone (NASONEX) 50 MCG/ACT spray Spray 2 sprays into both nostrils daily 1 Box 11     PARoxetine (PAXIL) 20 MG tablet 1 in the morning and 2 in the evening 30 tablet 1     polyethylene glycol (MIRALAX/GLYCOLAX) powder Take 17 g by mouth       ranitidine (ZANTAC) 150 MG tablet Take 1 tablet (150 mg) by  "mouth 2 times daily 60 tablet 1     senna (SENOKOT) 8.6 MG tablet Take 3 tablets by mouth At Bedtime 120 tablet      traZODone (DESYREL) 50 MG tablet Take 1 tablet (50 mg) by mouth nightly as needed for sleep 90 tablet 3     TRUEPLUS LANCETS 28G MISC 1 each       verapamil (CALAN-SR) 240 MG CR tablet Take 1 tablet (240 mg) by mouth daily 30 tablet 1     No current facility-administered medications for this visit.       Physical Exam: /84   Pulse 72   Ht 1.626 m (5' 4\")   Wt 85.3 kg (188 lb 1.6 oz)   BMI 32.29 kg/m      There were no vitals taken for this visit. No LMP recorded. Patient has had a hysterectomy. There is no height or weight on file to calculate BMI.    Gen:  is alert, comfortable in no acute distress,   Abdomen: Abdomen is soft, non-tender, non-distended,   Lungs: non-labored breathing.    Pelvic Exam:   Normal external female genitalia. The urethra was Normal.    Vagina: mild atrophy, normal support.   Uterus: surgically absent  Ovaries: No palpable mass   Vulva: No lesions, no pain   Rectal: Deferred       Pelvic floor muscles: Right iliococcygus and puboccogeus myalgia.     Voiding trial:        Labs:   No results found for: \"COLOR\", \"APPEARANCE\", \"URINEGLC\", \"URINEBILI\", \"URINEKETONE\", \"SG\", \"URINEPH\", \"PROTEIN\", \"UROBILINOGEN\", \"NITRITE\", \"LEUKEST\"  CBC RESULTS: No results for input(s): \"WBC\", \"RBC\", \"HGB\", \"HCT\", \"MCV\", \"MCH\", \"MCHC\", \"RDW\", \"PLT\" in the last 16699 hours.  @lastcmp@    A/P: Hannah Choudhary is a 74 year old F with   Hannah was seen today for pelvic pain and hematuria.    Diagnoses and all orders for this visit:    Gross hematuria  -     Cancel: Routine UA with Micro Reflex to Culture; Future  -     US Renal Complete Non-Vascular; Future  -     Routine UA with Micro Reflex to Culture    Chronic pelvic pain in female  -     Adult Urology  Referral  -     Physical Therapy  Referral; Future    Bladder pain  -     Cancel: Routine UA with Micro Reflex to " Culture; Future  -     Routine UA with Micro Reflex to Culture          Hematuria work up:   -cystoscopy with me   -zoila US ( has contrast allergey)  -referral to pelvic PT for levator myalgia      I spent a total of 45 minutes with  Hannah Choudhary  on the date of the encounter in chart review, face to face patient visit, review of tests, documentation and/or discussion with other providers about the issues documented above.     Bri Rodriguez MD, [facility]  , Department of OBGYN  Female Pelvic Medicine and Reconstructive Surgery ( Urogynecology)  CC  Patient Care Team:  Bonnie Pino MD as PCP - General (Family Medicine)  Jung Barrera MD as Referring Physician (Neurology)  Faviola Osuna, PhD LP (Psychology)  Grey Martinez APRN CNP as Nurse Practitioner (Nurse Practitioner)  BONNIE PION                Again, thank you for allowing me to participate in the care of your patient.      Sincerely,    Bri Rodriguez MD

## 2024-08-21 NOTE — PATIENT INSTRUCTIONS
Thank you for trusting us with your care!   Please be aware, if you are on Mychart, you may see your results prior to your providers review. If labs are abnormal, we will call or message you on ARPUt with a follow up plan.    If you need to contact us for questions about:  Symptoms, Scheduling & Medical Questions; Non-urgent (2-3 day response) [employer] message, Urgent (needing response today) [phone] (if after 3:30pm next day response)   Prescriptions: Please call your Pharmacy   Billing: [place] [phone] or [facility]:[phone]

## 2024-08-21 NOTE — PROGRESS NOTES
2024    Referring Provider: Bonnie Chaudhry MD  [address] [place] [address]    Primary Care Provider: Bonnie Chaudhry    CC: Hematuria    HPI:  Hannah Choudhary is a 74 year old  with medical hx sig for Parkinsonims, PTSD , cHTN and former Tobacco use who presents for evaluation of gross hematuria.   She says she sees blood in her urine on and off for several months. Her hx is sig for a cystic lesion of left kidney (  a 3.3 cm left superior cortical cystic lesion without evidence of definitive solid enhancement. )  s/p CT guided biopsy in  with benign findings. She says she sometimes have dysuria and low back pain and this comes and goes. Mild dysuria this morning. No fevers/chills/flank pain. No N/V.  Denies stress or urgency leaks. No urgency/frequency but does have nocturia ( voids 2-3 times at night).     Had cystic mass in her left kidney s/p  CT GUIDED NEEDLE BIOPSY on 10.24.23  1. Benign renal parenchyma  2. Negative for neoplasm in this sampling       Xray KUB 7.8.24  IMPRESSION:  No radiopaque kidney stones are evident. Unchanged small bilateral pelvic calcifications likely representing phleboliths.    No dilated bowel loops to suggest bowel obstruction.     EXAM: XR PELVIS & HIP LATERAL BILAT    DATE: 8/10/2023 3:08 PM    COMPARISON: None.    CLINICAL DATA: Pelvic and perineal pain.    VIEWS: A frontal view of the pelvis and lateral views of both hips were obtained.    FINDINGS:    No fracture, subluxation or dislocation is identified. There are mild degenerative changes of both hips with mild joint space narrowing and marginal osteophyte formation.     EXAM:  MRI FEMALE/GYN PELVIS W/O&W CON DATE:   2023     FINDINGS:    Hysterectomy. Vaginal cuff is unremarkable in appearance. The vaginal canal is grossly normal in appearance by MRI examination with preserved vesicovaginal fat plane. The left ovary measures 1.1 x 2.4 x 3.0 cm. No left  ovarian or adnexal abnormality visualized. The right ovary measures 1.2 x 2.5 x 2.8 cm. No right ovarian or adnexal abnormality visualized.     Grossly negative urinary bladder and urethra. Partially visualized sigmoid diverticulosis without surrounding inflammatory change. Grossly preserved pelvic flow voids. No pelvic lymphadenopathy. Pelvic musculature is symmetric.     There is a T1 and T2 hyperintense focus within the S1 sacral segment which is stable in size and morphology since MRI examination of the lumbar spine 3/15/2016. This likely represents an atypical hemangioma. No current aggressive characteristics. Remainder of the marrow signal throughout the partially visualized bony pelvis is unremarkable. Partially visualized sacral plexus is negative.       Pelvic Organ Prolapse Symptoms  Has some pelvic pressure. But no protrusion or bulge    Gastrointestinal Symptoms:  Denies chronic diarrhea, constipation. Denies bothersome fecal or flatal incontinence. Denies defecatory difficulties.       Sexual function/Pelvic floor pain/GYN:   Sexually active. No pain. No vaginal bleeding. S/p hysterectomy      Relevant Medical History:    Diabetes? no  High Blood pressure? yes     Recurrent UTIs? no  Sleep Apnea? no  Obesity? Body mass index is 32.29 kg/m .  History of Blood clots? no  Other medical problems: as above    Surgical History:      Past Surgical History:   Procedure Laterality Date    HYSTERECTOMY      partial hysterectomy    MAMMOPLASTY REDUCTION      x2    right arm surgery Right     fracture of right arm    TONSILLECTOMY      x2    TUBAL LIGATION         OB/Gyn History:  OB History   No obstetric history on file.       Medications/Vitamins/Supplements:   Current Outpatient Medications   Medication Sig Dispense Refill    acetaminophen (TYLENOL) 500 MG tablet       albuterol (2.5 MG/3ML) 0.083% neb solution Take 1 vial (2.5 mg) by nebulization every 6 hours as needed for shortness of breath / dyspnea or  wheezing 360 mL     albuterol (ALBUTEROL) 108 (90 BASE) MCG/ACT Inhaler Inhale 2 puffs into the lungs every 6 hours      aspirin 81 MG chewable tablet Take 1 tablet (81 mg) by mouth daily 108 tablet 3    benzoyl peroxide 10 % topical gel Apply topically daily      blood glucose monitoring (ACCU-CHEK FASTCLIX) lancets       Blood Glucose Monitoring Suppl (TRUE METRIX AIR GLUCOSE METER) W/DEVICE KIT       cetirizine (ZYRTEC) 10 MG tablet Take 1 tablet (10 mg) by mouth every evening 30 tablet 1    clonazePAM (KLONOPIN) 1 MG tablet Take 1 tablet (1 mg) by mouth 2 times daily as needed for anxiety 20 tablet 0    COMPRESSION STOCKINGS       donepezil (ARICEPT) 10 MG tablet Take 1 tablet (10 mg) by mouth daily 30 tablet 1    glucagon (GLUCAGON EMERGENCY) 1 MG kit 1 mg      HYDROcodone-acetaminophen (NORCO) 7.5-325 MG per tablet maximum  tablet(s) per day 20 tablet 0    lidocaine (LIDODERM) 5 % Patch Place 1 patch onto the skin every 24 hours 30 patch     lisinopril (PRINIVIL/ZESTRIL) 10 MG tablet Take 1 tablet (10 mg) by mouth daily 30 tablet 1    lovastatin (MEVACOR) 20 MG tablet Take 1 tablet (20 mg) by mouth At Bedtime 30 tablet 1    metFORMIN (GLUCOPHAGE) 500 MG tablet Take 1 tablet (500 mg) by mouth daily (with breakfast) 60 tablet     mometasone (NASONEX) 50 MCG/ACT spray Spray 2 sprays into both nostrils daily 1 Box 11    mometasone (NASONEX) 50 MCG/ACT spray Spray 2 sprays into both nostrils daily 1 Box 11    PARoxetine (PAXIL) 20 MG tablet 1 in the morning and 2 in the evening 30 tablet 1    polyethylene glycol (MIRALAX/GLYCOLAX) powder Take 17 g by mouth      ranitidine (ZANTAC) 150 MG tablet Take 1 tablet (150 mg) by mouth 2 times daily 60 tablet 1    senna (SENOKOT) 8.6 MG tablet Take 3 tablets by mouth At Bedtime 120 tablet     traZODone (DESYREL) 50 MG tablet Take 1 tablet (50 mg) by mouth nightly as needed for sleep 90 tablet 3    TRUEPLUS LANCETS 28G MISC 1 each      verapamil (CALAN-SR) 240 MG CR tablet  Take 1 tablet (240 mg) by mouth daily 30 tablet 1     No current facility-administered medications for this visit.         Medical History:      Past Medical History:   Diagnosis Date    Anxiety 12/6/2016    Cataract right eye 12/6/2016    Cognitive disorder 2/2/2017    neuropsych 2017  IMPRESSIONS AND RECOMMENDATIONS  Current results indicate executive dysfunction, including difficulty with planning, organization, perseverations, and conceptualization. Information and psychomotor processing speed are slowed. Visual processing is impaired, and she demonstrates mild anomia. Learning and memory are impaired. She demonstrates a rapid forgetting rate. Basic attention    Concussion 12/5/2016    Depression 12/6/2016    Ear problem, right  - hole in the right ear 12/6/2016    Family history of diabetes mellitus 12/6/2016    Family history of obesity 12/6/2016    Family history of stress 12/6/2016    H/O cardiovascular stress test 12/6/2016 2016 October Outpatient Nuclear Stress Test10/14/2016  [facility]  Outpatient Nuclear Stress Test10/14/2016  [facility]  Result Transcription Jeannie Celis MD - 10/14/2016 11:59 PM CDTPJeannie shaw MD - 10/14/2016 11:59 PM CDT CC: [facility]   REPORT TITLE:  Nuclear Cardiology Final Report  Facility:  [facility]    H/O CT scan of head 12/6/2016    CT Head NON-Contrast4/16/2010  [facility]  Result Impression  1.  No acute findings noncontrast CT of the head.   2.  Appearance suggesting early periventricular small vessel ischemic  changes left frontal horn region.   Desmond Rosario M.D. Diagnostic/Musculoskeletal Radiologist Consulting [employer] SDS/sr D: 04/16/10  T: 04/17/10   Result Narrative CT     H/O magnetic resonance imaging of brain and brain stem 12/6/2016    Mild atrophy, mild white matter disease    H/O magnetic resonance imaging of lumbar spine  12/6/2016    MRI SPINE LUMBAR WO IV CONTRAST3/15/2016  [facility]  Result Impression IMPRESSION:   1.  There is a minimal disc bulging at L1-L2, L3-L4, and L4-L5.  No significant thecal sac or neuroforaminal narrowing and no evidence of nerve or impingement.  Result Narrative EXAM:  MRI LUMBAR SPINE WITHOUT CONTRAST  DATE:  3/15/2016 8:44 PM  ICD 10:                 ADDITIONAL CLINICAL DATA:  L    Headache 12/5/2016    History of MRI of thoracic spine 12/6/2016    MRI SPINE THORACIC W/O CONTR)3/15/2016  [facility]  Result Impression IMPRESSION:   1.  Benign hemangiomas, otherwise negative.  Result Narrative EXAM:  THORACIC SPINE WITHOUT GADOLINIUM  DATE:  3/15/2016 9:00 PM  ICD 10:               ADDITIONAL CLINICAL DATA:  Back pain.    COMPARISON:  None  TECHNIQUE:  Sagittal FLAIR T1, sagittal FSE T2, sagittal fat saturated FSE T2.  FINDINGS    History of stroke 2005 per patient 12/6/2016    Insomnia 12/6/2016    Laboratory test 12/19/2016    Osvaldo Andrew MD Boegel, Buddy MCKOY MD 12/19/2016 12/19/2016           Narrative         Examination: Nuclear medicine DATscan for Dopamine Receptor Localization.    Examination: NM BRAIN IMAGING TOMOGRAPHIC (SPECT) DATSCAN  Date: 12/19/2016 7:46 AM  Indication: Parkinson's disease  Comparison: None  Additional Information: none  Interfering Medications: paroxetine  Technique:  The pat    Memory loss 12/5/2016    Parkinsonism (H) 12/5/2016    Post-trauma syndrome 12/5/2016    Urinary frequency 12/6/2016    Urinary urgency 12/6/2016     ROS  Social History    Social History     Socioeconomic History    Marital status: Single     Spouse name: Not on file    Number of children: Not on file    Years of education: Not on file    Highest education level: Not on file   Occupational History    Not on file   Tobacco Use    Smoking status: Former     Current packs/day: 0.00     Types: Cigarettes     Quit date: 3/27/1987     Years since  quittin.4    Smokeless tobacco: Former   Substance and Sexual Activity    Alcohol use: Yes     Alcohol/week: 1.0 - 2.0 standard drink of alcohol     Types: 1 - 2 Standard drinks or equivalent per week     Comment: PER YEAR    Drug use: Not on file    Sexual activity: Not Currently   Other Topics Concern    Parent/sibling w/ CABG, MI or angioplasty before 65F 55M? Not Asked   Social History Narrative    SINGLE. LIVES IN [place].     2 twin daughters    3 sons     Social Determinants of Health     Financial Resource Strain: Not on file   Food Insecurity: Not on file   Transportation Needs: Not on file   Physical Activity: Not on file   Stress: Not on file   Social Connections: Not on file   Interpersonal Safety: Not At Risk (2024)    Received from [facility]     Humiliation, Afraid, Rape, and Kick questionnaire     Fear of Current or Ex-Partner: No     Emotionally Abused: No     Physically Abused: No     Sexually Abused: No   Housing Stability: Not on file       Family History  Family History   Problem Relation Age of Onset    Heart Disease Mother     Liver Cancer Father     Intellectual Disability Son     Obesity Son     Diabetes Son     Hypertension Other     Other - See Comments Daughter         gallbladder       Allergy    Allergies   Allergen Reactions    Ivp Dye [Contrast Dye] Hives    Latex Other (See Comments)     Other reaction(s): Hives  swelling    No Clinical Screening - See Comments      Adhesive Tape, IVP Dye    Seafood      Other reaction(s): Hives    Shellfish Allergy     Shellfish-Derived Products Hives     Scallops       Current Outpatient Medications   Medication Sig Dispense Refill    acetaminophen (TYLENOL) 500 MG tablet       albuterol (2.5 MG/3ML) 0.083% neb solution Take 1 vial (2.5 mg) by nebulization every 6 hours as needed for shortness of breath / dyspnea or wheezing 360 mL     albuterol (ALBUTEROL) 108 (90 BASE) MCG/ACT Inhaler Inhale 2 puffs into the lungs every 6  hours      aspirin 81 MG chewable tablet Take 1 tablet (81 mg) by mouth daily 108 tablet 3    benzoyl peroxide 10 % topical gel Apply topically daily      blood glucose monitoring (ACCU-CHEK FASTCLIX) lancets       Blood Glucose Monitoring Suppl (TRUE METRIX AIR GLUCOSE METER) W/DEVICE KIT       cetirizine (ZYRTEC) 10 MG tablet Take 1 tablet (10 mg) by mouth every evening 30 tablet 1    clonazePAM (KLONOPIN) 1 MG tablet Take 1 tablet (1 mg) by mouth 2 times daily as needed for anxiety 20 tablet 0    COMPRESSION STOCKINGS       donepezil (ARICEPT) 10 MG tablet Take 1 tablet (10 mg) by mouth daily 30 tablet 1    glucagon (GLUCAGON EMERGENCY) 1 MG kit 1 mg      HYDROcodone-acetaminophen (NORCO) 7.5-325 MG per tablet maximum  tablet(s) per day 20 tablet 0    lidocaine (LIDODERM) 5 % Patch Place 1 patch onto the skin every 24 hours 30 patch     lisinopril (PRINIVIL/ZESTRIL) 10 MG tablet Take 1 tablet (10 mg) by mouth daily 30 tablet 1    lovastatin (MEVACOR) 20 MG tablet Take 1 tablet (20 mg) by mouth At Bedtime 30 tablet 1    metFORMIN (GLUCOPHAGE) 500 MG tablet Take 1 tablet (500 mg) by mouth daily (with breakfast) 60 tablet     mometasone (NASONEX) 50 MCG/ACT spray Spray 2 sprays into both nostrils daily 1 Box 11    mometasone (NASONEX) 50 MCG/ACT spray Spray 2 sprays into both nostrils daily 1 Box 11    PARoxetine (PAXIL) 20 MG tablet 1 in the morning and 2 in the evening 30 tablet 1    polyethylene glycol (MIRALAX/GLYCOLAX) powder Take 17 g by mouth      ranitidine (ZANTAC) 150 MG tablet Take 1 tablet (150 mg) by mouth 2 times daily 60 tablet 1    senna (SENOKOT) 8.6 MG tablet Take 3 tablets by mouth At Bedtime 120 tablet     traZODone (DESYREL) 50 MG tablet Take 1 tablet (50 mg) by mouth nightly as needed for sleep 90 tablet 3    TRUEPLUS LANCETS 28G MISC 1 each      verapamil (CALAN-SR) 240 MG CR tablet Take 1 tablet (240 mg) by mouth daily 30 tablet 1     No current facility-administered medications for this  "visit.       Physical Exam: /84   Pulse 72   Ht 1.626 m (5' 4\")   Wt 85.3 kg (188 lb 1.6 oz)   BMI 32.29 kg/m      There were no vitals taken for this visit. No LMP recorded. Patient has had a hysterectomy. There is no height or weight on file to calculate BMI.    Gen:  is alert, comfortable in no acute distress,   Abdomen: Abdomen is soft, non-tender, non-distended,   Lungs: non-labored breathing.    Pelvic Exam:   Normal external female genitalia. The urethra was Normal.    Vagina: mild atrophy, normal support.   Uterus: surgically absent  Ovaries: No palpable mass   Vulva: No lesions, no pain   Rectal: Deferred       Pelvic floor muscles: Right iliococcygus and puboccogeus myalgia.     Voiding trial:        Labs:   No results found for: \"COLOR\", \"APPEARANCE\", \"URINEGLC\", \"URINEBILI\", \"URINEKETONE\", \"SG\", \"URINEPH\", \"PROTEIN\", \"UROBILINOGEN\", \"NITRITE\", \"LEUKEST\"  CBC RESULTS: No results for input(s): \"WBC\", \"RBC\", \"HGB\", \"HCT\", \"MCV\", \"MCH\", \"MCHC\", \"RDW\", \"PLT\" in the last [id number] hours.  @[facility]@    A/P: Hannah Choudhary is a 74 year old F with   Hannah was seen today for pelvic pain and hematuria.    Diagnoses and all orders for this visit:    Gross hematuria  -     Cancel: Routine UA with Micro Reflex to Culture; Future  -     US Renal Complete Non-Vascular; Future  -     Routine UA with Micro Reflex to Culture    Chronic pelvic pain in female  -     Adult Urology  Referral  -     Physical Therapy  Referral; Future    Bladder pain  -     Cancel: Routine UA with Micro Reflex to Culture; Future  -     Routine UA with Micro Reflex to Culture          Hematuria work up:   -cystoscopy with me   -zoila US ( has contrast allergey)  -referral to pelvic PT for levator myalgia      I spent a total of 45 minutes with  Hannah Choudhary  on the date of the encounter in chart review, face to face patient visit, review of tests, documentation and/or discussion with other providers about the issues " documented above.     Bri Rodriguez MD, [facility]  , Department of OBGYN  Female Pelvic Medicine and Reconstructive Surgery ( Urogynecology)  CC  Patient Care Team:  Bonnie Pino MD as PCP - General (Family Medicine)  olena, Jung Valadez MD as Referring Physician (Neurology)  Faviola Osuna, PhD LP (Psychology)  Grey Martinez APRN CNP as Nurse Practitioner (Nurse Practitioner)  BONNIE PINO

## 2024-08-23 LAB — BACTERIA UR CULT: NO GROWTH

## 2024-08-28 ENCOUNTER — PRE VISIT (OUTPATIENT)
Dept: UROLOGY | Facility: CLINIC | Age: 74
End: 2024-08-28
Payer: COMMERCIAL

## 2024-08-28 DIAGNOSIS — R31.0 GROSS HEMATURIA: Primary | ICD-10-CM

## 2024-08-28 NOTE — TELEPHONE ENCOUNTER
Reason for visit: Cystoscopy     Relevant information: gross hematuria, bladder pain, pelvic pain    Records/imaging/labs/orders: in epic    Pt called: No need for a call    At Rooming: urine + dip, notify provider if nitrites or leukocytes, ask pt if urojet    Tristan Celis  8/28/2024  1:10 PM

## 2024-09-01 RX ORDER — LIDOCAINE HYDROCHLORIDE 20 MG/ML
JELLY TOPICAL ONCE
Status: COMPLETED | OUTPATIENT
Start: 2024-09-13 | End: 2024-09-13

## 2024-09-13 ENCOUNTER — OFFICE VISIT (OUTPATIENT)
Dept: UROLOGY | Facility: CLINIC | Age: 74
End: 2024-09-13
Payer: COMMERCIAL

## 2024-09-13 VITALS
HEART RATE: 76 BPM | SYSTOLIC BLOOD PRESSURE: 129 MMHG | DIASTOLIC BLOOD PRESSURE: 81 MMHG | WEIGHT: 184 LBS | HEIGHT: 64 IN | BODY MASS INDEX: 31.41 KG/M2

## 2024-09-13 DIAGNOSIS — N95.2 VAGINAL ATROPHY: ICD-10-CM

## 2024-09-13 DIAGNOSIS — Z13.31 POSITIVE SCREENING FOR DEPRESSION ON 2-ITEM PATIENT HEALTH QUESTIONNAIRE (PHQ-2): ICD-10-CM

## 2024-09-13 DIAGNOSIS — R31.0 GROSS HEMATURIA: Primary | ICD-10-CM

## 2024-09-13 LAB
ALBUMIN UR-MCNC: NEGATIVE MG/DL
APPEARANCE UR: CLEAR
BILIRUB UR QL STRIP: NEGATIVE
COLOR UR AUTO: YELLOW
GLUCOSE UR STRIP-MCNC: NEGATIVE MG/DL
HGB UR QL STRIP: NEGATIVE
KETONES UR STRIP-MCNC: NEGATIVE MG/DL
LEUKOCYTE ESTERASE UR QL STRIP: ABNORMAL
NITRATE UR QL: NEGATIVE
PH UR STRIP: 5.5 [PH] (ref 5–8)
SP GR UR STRIP: 1.01 (ref 1–1.03)
UROBILINOGEN UR STRIP-ACNC: 0.2 E.U./DL

## 2024-09-13 PROCEDURE — 81003 URINALYSIS AUTO W/O SCOPE: CPT | Performed by: PATHOLOGY

## 2024-09-13 PROCEDURE — 52000 CYSTOURETHROSCOPY: CPT | Performed by: OBSTETRICS & GYNECOLOGY

## 2024-09-13 PROCEDURE — 99000 SPECIMEN HANDLING OFFICE-LAB: CPT | Performed by: PATHOLOGY

## 2024-09-13 PROCEDURE — 87086 URINE CULTURE/COLONY COUNT: CPT | Performed by: OBSTETRICS & GYNECOLOGY

## 2024-09-13 RX ORDER — LIDOCAINE HYDROCHLORIDE 20 MG/ML
JELLY TOPICAL ONCE
Status: COMPLETED | OUTPATIENT
Start: 2024-09-13 | End: 2024-09-13

## 2024-09-13 RX ORDER — FERROUS SULFATE 325(65) MG
325 TABLET ORAL
COMMUNITY
Start: 2024-08-23

## 2024-09-13 RX ORDER — ESTRADIOL 0.1 MG/G
1 CREAM VAGINAL
Qty: 42.5 G | Refills: 3 | Status: SHIPPED | OUTPATIENT
Start: 2024-09-13

## 2024-09-13 RX ADMIN — LIDOCAINE HYDROCHLORIDE: 20 JELLY TOPICAL at 13:43

## 2024-09-13 RX ADMIN — LIDOCAINE HYDROCHLORIDE: 20 JELLY TOPICAL at 15:38

## 2024-09-13 ASSESSMENT — PAIN SCALES - GENERAL: PAINLEVEL: MODERATE PAIN (5)

## 2024-09-13 ASSESSMENT — PATIENT HEALTH QUESTIONNAIRE - PHQ9: SUM OF ALL RESPONSES TO PHQ QUESTIONS 1-9: 12

## 2024-09-13 NOTE — PROGRESS NOTES
Depression Response    Patient completed the PHQ-9 assessment for depression and scored >9? Yes  Question 9 on the PHQ-9 was positive for suicidality? No  Does patient have current mental health provider? No    Is this a virtual visit? No    I personally notified the following: clinic nurse    Dora Garland LPN on 9/13/2024 at 1:00 PM

## 2024-09-13 NOTE — PROGRESS NOTES
Ms Choudhary is here for cystoscopy for intermittent gross hematuria.     Renal US was ordered by me on 8.21.24 but looks like she has not scheduled this. Has known cystic lesion of left kidney (  a 3.3 cm left superior cortical cystic lesion without evidence of definitive solid enhancement. )  s/p CT guided biopsy in 2023 with benign findings.         Office cystoscopy    Patient was verbally consented for cystoscopy after risks (urethral discomfort, irritative bladder, uti etc) and indications/benefits were discussed    External urethral meatus was cleaned with betadine and lidojet place at urethral meatus for comfort    Cystourethroscopy with a flexible scope performed showed normal bladder and urethral mucosa without evidence of trauma, lesions, or foreign bodies. Small cloudy biofilm vs microscopic stones floating in the bladder. Bilaterally normally effluxing ureters noted.     Patient tolerated the procedure well.       Assessment and plan  Hannah was seen today for cystoscopy.    Diagnoses and all orders for this visit:    Gross hematuria  -     lidocaine (XYLOCAINE) 2 % external gel  -     CYSTOURETHROSCOPY    Other orders  -     Cl[place]k Urine Macroscopic POCT      Cystoscopy was normal today  Will provide her with the number to schedule her renal US which was ordered previously but not done  Vaginal atrophy on exam. Estrace cream ordered

## 2024-09-13 NOTE — NURSING NOTE
"Chief Complaint   Patient presents with    Cystoscopy       Blood pressure 129/81, pulse 76, height 1.626 m (5' 4\"), weight 83.5 kg (184 lb), not currently breastfeeding. Body mass index is 31.58 kg/m .    Patient Active Problem List   Diagnosis    Headache    Memory loss    Post-trauma syndrome    Concussion    Parkinsonism (H)    H/O magnetic resonance imaging of brain and brain stem    Stress due to family tension    Anxiety    Depression    Chronic kidney disease, stage III (moderate) (H)    Chronic back pain    Other long term (current) drug therapy    Moderate episode of recurrent major depressive disorder (H)    Environmental allergies    Heartburn    Hematuria    Hypercholesterolemia    Hypertension    Mild episode of recurrent major depressive disorder (H24)    Obesity with body mass index 30 or greater    Reactive hypoglycemia    Type 2 diabetes mellitus (H)    Sleep apnea    Insomnia    Cataract right eye    Ear problem, right  - hole in the right ear    Family history of diabetes mellitus    Family history of obesity    History of stroke 2005 per patient    Urinary frequency    Urinary urgency    H/O cardiovascular stress test    History of MRI of thoracic spine    H/O magnetic resonance imaging of lumbar spine    H/O CT scan of head    Laboratory test    Cognitive disorder       Allergies   Allergen Reactions    Ivp Dye [Contrast Dye] Hives    Latex Other (See Comments)     Other reaction(s): Hives  swelling    No Clinical Screening - See Comments      Adhesive Tape, IVP Dye    Seafood      Other reaction(s): Hives    Shellfish Allergy     Shellfish-Derived Products Hives     Scallops       Current Outpatient Medications   Medication Sig Dispense Refill    acetaminophen (TYLENOL) 500 MG tablet       albuterol (2.5 MG/3ML) 0.083% neb solution Take 1 vial (2.5 mg) by nebulization every 6 hours as needed for shortness of breath / dyspnea or wheezing 360 mL     albuterol (ALBUTEROL) 108 (90 BASE) MCG/ACT " Inhaler Inhale 2 puffs into the lungs every 6 hours      aspirin 81 MG chewable tablet Take 1 tablet (81 mg) by mouth daily 108 tablet 3    benzoyl peroxide 10 % topical gel Apply topically daily      blood glucose monitoring (ACCU-CHEK FASTCLIX) lancets       Blood Glucose Monitoring Suppl (TRUE METRIX AIR GLUCOSE METER) W/DEVICE KIT       cetirizine (ZYRTEC) 10 MG tablet Take 1 tablet (10 mg) by mouth every evening 30 tablet 1    clonazePAM (KLONOPIN) 1 MG tablet Take 1 tablet (1 mg) by mouth 2 times daily as needed for anxiety 20 tablet 0    COMPRESSION STOCKINGS       donepezil (ARICEPT) 10 MG tablet Take 1 tablet (10 mg) by mouth daily 30 tablet 1    ferrous sulfate (FEROSUL) 325 (65 Fe) MG tablet Take 325 mg by mouth.      glucagon (GLUCAGON EMERGENCY) 1 MG kit 1 mg      HYDROcodone-acetaminophen (NORCO) 7.5-325 MG per tablet maximum  tablet(s) per day 20 tablet 0    lidocaine (LIDODERM) 5 % Patch Place 1 patch onto the skin every 24 hours 30 patch     lisinopril (PRINIVIL/ZESTRIL) 10 MG tablet Take 1 tablet (10 mg) by mouth daily 30 tablet 1    lovastatin (MEVACOR) 20 MG tablet Take 1 tablet (20 mg) by mouth At Bedtime 30 tablet 1    metFORMIN (GLUCOPHAGE) 500 MG tablet Take 1 tablet (500 mg) by mouth daily (with breakfast) 60 tablet     mometasone (NASONEX) 50 MCG/ACT spray Spray 2 sprays into both nostrils daily 1 Box 11    mometasone (NASONEX) 50 MCG/ACT spray Spray 2 sprays into both nostrils daily 1 Box 11    PARoxetine (PAXIL) 20 MG tablet 1 in the morning and 2 in the evening 30 tablet 1    polyethylene glycol (MIRALAX/GLYCOLAX) powder Take 17 g by mouth      ranitidine (ZANTAC) 150 MG tablet Take 1 tablet (150 mg) by mouth 2 times daily 60 tablet 1    senna (SENOKOT) 8.6 MG tablet Take 3 tablets by mouth At Bedtime 120 tablet     traZODone (DESYREL) 50 MG tablet Take 1 tablet (50 mg) by mouth nightly as needed for sleep 90 tablet 3    TRUEPLUS LANCETS 28G MISC 1 each      verapamil (CALAN-SR) 240 MG  CR tablet Take 1 tablet (240 mg) by mouth daily 30 tablet 1       Social History     Tobacco Use    Smoking status: Former     Current packs/day: 0.00     Types: Cigarettes     Quit date: 3/27/1987     Years since quittin.4    Smokeless tobacco: Former   Substance Use Topics    Alcohol use: Yes     Alcohol/week: 1.0 - 2.0 standard drink of alcohol     Types: 1 - 2 Standard drinks or equivalent per week     Comment: PER YEAR       Invasive Procedure Safety Checklist:    Procedure: Cystoscopy    Action: Complete sections and checkboxes as appropriate.    Pre-procedure:  1. Patient ID Verified with 2 identifiers (Lauren and  or MRN) : YES    2. Procedure and site verified with patient/designee (when able) : YES    3. Accurate consent documentation in medical record : YES    4. H&P (or appropriate assessment) documented in medical record : N/A  H&P must be up to 30 days prior to procedure an updated within 24 hours of                 Procedure as applicable.     5. Relevant diagnostic and radiology test results appropriately labeled and displayed as applicable : YES    6. Blood products, implants, devices, and/or special equipment available for the procedure as applicable : YES    7. Procedure site(s) marked with provider initials [Exclusions: none] : NO    8. Marking not required. Reason : Yes  Procedure does not require site marking    Time Out:     Time-Out performed immediately prior to starting procedure, including verbal and active participation of all team members addressing: YES    1. Correct patient identity.  2. Confirmed that the correct side and site are marked.  3. An accurate procedure to be done.  4. Agreement on the procedure to be done.  5. Correct patient position.  6. Relevant images and results are properly labeled and appropriately displayed.  7. The need to administer antibiotics or fluids for irrigation purposes during the procedure as applicable.  8. Safety precautions based on patient  history or medication use.    During Procedure: Verification of correct person, site, and procedure occurs any time the responsibility for care of the patient is transferred to another member of the care team.    The following medication was given:     MEDICATION:  Lidocaine without epinephrine 2% jelly  ROUTE: urethral   SITE: urethral   DOSE: 10 mL  LOT #: [id number]  : [employer]  EXPIRATION DATE: 4-26  NDC#: [id number]   Was there drug waste? No    Prior to med admin, verified patient identity using patient's name and date of birth.  Due to med administration, patient instructed to remain in clinic for 15 minutes  afterwards, and to report any adverse reaction to me immediately.    Drug Amount Wasted:  None.  Vial/Syringe: Syringe      Lindsay Kelsey  9/13/2024  1:05 PM

## 2024-09-13 NOTE — LETTER
9/13/2024       RE: Hannah Choudhary  [address] [facility] [address]     Dear Colleague,    Thank you for referring your patient, Hannah Choudhary, to the [facility] [place] at [facility]. Please see a copy of my visit note below.    Ms Choudhary is here for cystoscopy for intermittent gross hematuria.     Renal US was ordered by me on 8.21.24 but looks like she has not scheduled this. Has known cystic lesion of left kidney (  a 3.3 cm left superior cortical cystic lesion without evidence of definitive solid enhancement. )  s/p CT guided biopsy in 2023 with benign findings.         Office cystoscopy    Patient was verbally consented for cystoscopy after risks (urethral discomfort, irritative bladder, uti etc) and indications/benefits were discussed    External urethral meatus was cleaned with betadine and lidojet place at urethral meatus for comfort    Cystourethroscopy with a flexible scope performed showed normal bladder and urethral mucosa without evidence of trauma, lesions, or foreign bodies. Small cloudy biofilm vs microscopic stones floating in the bladder. Bilaterally normally effluxing ureters noted.     Patient tolerated the procedure well.       Assessment and plan  Hannah was seen today for cystoscopy.    Diagnoses and all orders for this visit:    Gross hematuria  -     lidocaine (XYLOCAINE) 2 % external gel  -     CYSTOURETHROSCOPY    Other orders  -     Clinitek Urine Macroscopic POCT      Cystoscopy was normal today  Will provide her with the number to schedule her renal US which was ordered previously but not done  Vaginal atrophy on exam. Estrace cream ordered    Depression Response    Patient completed the PHQ-9 assessment for depression and scored >9? Yes  Question 9 on the PHQ-9 was positive for suicidality? No  Does patient have current mental health provider? No    Is this a virtual visit? No    I personally notified  the following: clinic nurse    Dora Garland LPN on 9/13/2024 at 1:00 PM           Again, thank you for allowing me to participate in the care of your patient.      Sincerely,    Bri Rodriguez MD

## 2024-09-13 NOTE — PATIENT INSTRUCTIONS
"- estrogen cream from the pharmacy   -get scheduled for renal imaging that Dr. Rodriguez ordered (US renal complete)  -Follow-up with our clinic as needed.      AFTER YOUR CYSTOSCOPY        You have just completed a cystoscopy, or \"cysto\", which allowed your physician to learn more about your bladder (or to remove a stent placed after surgery). We suggest that you continue to avoid caffeine, fruit juice, and alcohol for the next 24 hours, however, you are encouraged to return to your normal activities.         A few things that are considered normal after your cystoscopy:     * Small amount of bleeding (or spotting) that clears within the next 24 hours     * Slight burning sensation with urination     * Sensation to of needing to avoid more frequently     * The feeling of \"air\" in your urine     * Mild discomfort that is relieved with Tylenol        Please contact our office promptly if you:     * Develop a fever above 101 degrees     * Are unable to urinate     * Develop bright red blood that does not stop     * Severe pain or swelling         Please contact our office with any concerns or questions @ [phone]    It was a pleasure meeting with you today.  Thank you for allowing me and my team the privilege of caring for you today.  YOU are the reason we are here, and I truly hope we provided you with the excellent service you deserve.  Please let us know if there is anything else we can do for you so that we can be sure you are leaving completely satisfied with your care experience.          "

## 2024-09-14 LAB — BACTERIA UR CULT: NORMAL

## 2024-10-26 ENCOUNTER — DOCUMENTATION ONLY (OUTPATIENT)
Dept: UROLOGY | Facility: CLINIC | Age: 74
End: 2024-10-26
Payer: COMMERCIAL

## 2024-10-26 DIAGNOSIS — R31.9 HEMATURIA, UNSPECIFIED TYPE: Primary | ICD-10-CM

## 2024-10-26 NOTE — PROGRESS NOTES
"Patient call received this morning    Hannah says that she is noticing bleeding from the vagina \" string like\" when she is wiping. She says this is coming from her vagina and not her bladder or rectum.  She is also having some abdominal pain and low back pain without nausea/vomiting/fevers/chills.  No flank pain. She is s/p hysterectomy. I had done cystoscopy on 9.13.24 for  hematuria ( given her vaginal bleeding report was unclear) but that was negative. She was supposed to get renal US to complete the work up but had not scheduled this and seems to have forgotten about this. She had an abdominal US done on 8.23.24 which showed stable benign appearing renal cysts  (5cm on the right kidney and 3.5 cm on the left kidney) but this imaging was not specific for upper urinary tract evaluation.   She had CT abd/pelvis back in 2023 that showed some thickening of vagina and this was followed by Pelvic MRI which was  normal with the exception of a possible S1 sacral atypical hemangioma. Unclear if this is contributing to her current symptoms.     Abd US 8.23.24    IMPRESSION:    1.  Benign renal cysts by sonography bilaterally. The right cyst is slightly enlarged compared to the prior study of August 2023. The left cyst is not enlarged.   2.  Pancreas is predominantly obscured by bowel gas.   3.  No acute findings related to the gallbladder.   4.  Echogenic liver is nonspecific but is compatible with hepatic steatosis.     EXAM:  MRI FEMALE/GYN PELVIS [facility] CON 9.28.23  Impression    IMPRESSION:  1. Hysterectomy with unremarkable appearance of the vaginal cuff and vaginal canal.  2. Bilateral ovaries and adnexal regions are unremarkable.  3. Sigmoid diverticulosis without surrounding inflammatory change.  4. No pelvic lymphadenopathy or pathologic free fluid.  5. Stable T1 and T2 hyperintense focus within the S1 sacral segment since 2016, likely representing an atypical hemangioma.    Today we discussed the following  -May " be good to see our general obgyn providers for her vaginal bleeding. Number provided to our clinic for her to call and make an appointment  -renal US order placed. She has contrast allergy so not ideal candidate for CT urogram  -follow up with her PMD for her abdominal/low back pain. If symptoms worsen over the weekend, go to urgent care or ED

## 2024-10-26 NOTE — PROGRESS NOTES
"Got a vocera page X 2 from patient later morning to day about concern for \"uterine bleeding\". Attempt to call patient back X 2 was not successful. Attempt to reach her via  also unsuccessful.  Patient not answering and voice mail is full so unable to reach a message.   "

## 2024-12-31 ENCOUNTER — TRANSCRIBE ORDERS (OUTPATIENT)
Dept: OTHER | Age: 74
End: 2024-12-31

## 2024-12-31 DIAGNOSIS — R32 URINARY LEAKAGE: Primary | ICD-10-CM

## 2025-01-28 ENCOUNTER — VIRTUAL VISIT (OUTPATIENT)
Dept: UROLOGY | Facility: CLINIC | Age: 75
End: 2025-01-28
Attending: OBSTETRICS & GYNECOLOGY
Payer: COMMERCIAL

## 2025-01-28 DIAGNOSIS — N39.41 URGE INCONTINENCE: Primary | ICD-10-CM

## 2025-01-28 RX ORDER — METHOCARBAMOL 750 MG/1
TABLET, FILM COATED ORAL
COMMUNITY
Start: 2024-09-05

## 2025-01-28 RX ORDER — FUROSEMIDE 20 MG/1
1 TABLET ORAL DAILY
COMMUNITY

## 2025-01-28 RX ORDER — MONTELUKAST SODIUM 10 MG/1
1 TABLET ORAL AT BEDTIME
COMMUNITY

## 2025-01-28 RX ORDER — DULOXETIN HYDROCHLORIDE 60 MG/1
CAPSULE, DELAYED RELEASE ORAL
COMMUNITY
Start: 2025-01-10

## 2025-01-28 RX ORDER — SOLIFENACIN SUCCINATE 5 MG/1
5 TABLET, FILM COATED ORAL DAILY
Qty: 90 TABLET | Refills: 3 | Status: SHIPPED | OUTPATIENT
Start: 2025-01-28

## 2025-01-28 RX ORDER — ROPINIROLE 0.5 MG/1
0.5 TABLET, FILM COATED ORAL
COMMUNITY

## 2025-01-28 RX ORDER — OXYBUTYNIN CHLORIDE 5 MG/1
1 TABLET ORAL AT BEDTIME
COMMUNITY
End: 2025-01-28

## 2025-01-28 RX ORDER — GABAPENTIN 300 MG/1
CAPSULE ORAL
COMMUNITY

## 2025-01-28 RX ORDER — FAMOTIDINE 20 MG/1
1 TABLET, FILM COATED ORAL DAILY
COMMUNITY

## 2025-01-28 RX ORDER — DULOXETIN HYDROCHLORIDE 20 MG/1
20 CAPSULE, DELAYED RELEASE ORAL
COMMUNITY

## 2025-01-28 RX ORDER — LOSARTAN POTASSIUM 100 MG/1
1 TABLET ORAL DAILY
COMMUNITY

## 2025-01-28 RX ORDER — MAGNESIUM 200 MG
1 TABLET ORAL
COMMUNITY

## 2025-01-28 RX ORDER — HYDROCHLOROTHIAZIDE 12.5 MG/1
1 TABLET ORAL DAILY
COMMUNITY
Start: 2024-05-21

## 2025-01-28 RX ORDER — MIRTAZAPINE 30 MG/1
1 TABLET, FILM COATED ORAL AT BEDTIME
COMMUNITY

## 2025-01-28 RX ORDER — CEFUROXIME AXETIL 500 MG/1
TABLET ORAL
COMMUNITY
Start: 2024-11-05

## 2025-01-28 NOTE — NURSING NOTE
Current patient location: [address] [place] [address]    Is the patient currently in the state of [place]? YES    Visit mode: VIDEO    If the visit is dropped, the patient can be reconnected by:VIDEO VISIT: Send to e-mail at: [email or web address]    Will anyone else be joining the visit? NO  (If patient encounters technical issues they should call [phone]    Are changes needed to the allergy or medication list? No    Are refills needed on medications prescribed by this physician? NO    Rooming Documentation:  Unable to complete questionnaire(s) due to time    Reason for visit: Consult    Jennifer DE LA ROSA

## 2025-01-28 NOTE — PROGRESS NOTES
Virtual Visit Details    Type of service:  Video Visit   Video Start Time: 4:01 PM  Video End Time:4:27 PM    Originating Location (pt. Location): Home    Distant Location (provider location):  On-site  Platform used for Video Visit:[facility]      Chief complaint: Urinary incontinence    Subjective     Hannah Choudhary is a 74 year old    with med hx sig for stroke, parkinsonism, anxiety, depression and post trauma syndrome who presents for a video visit today to follow up for urgency urinary incontinence. She wears 1-2  incontinence pull ups a day. She takes it once a day ( 5 mg ) every evening. She had renal calculus which was removed on 24 ( Cystoscopy, left stent exchange, left ureteroscopy with holmium laser lithotripsy and stone fragment extraction  on 24). Stents have Today she says she is doing well from that, just small blood from vaginal vs urine . She is s/p hysterectomy so no uterus.       Surgical History:      Past Surgical History:   Procedure Laterality Date    HYSTERECTOMY      partial hysterectomy    MAMMOPLASTY REDUCTION      x2    right arm surgery Right     fracture of right arm    TONSILLECTOMY      x2    TUBAL LIGATION         OB/Gyn History:  OB History    Para Term  AB Living   6 6 0 0 0 0   SAB IAB Ectopic Multiple Live Births   0 0 0 0 0      # Outcome Date GA Lbr Ronak/2nd Weight Sex Type Anes PTL Lv   6 Para            5 Para            4 Para            3 Para            2 Para            1 Para                Medications/Vitamins/Supplements:   Current Outpatient Medications   Medication Sig Dispense Refill    cefuroxime (CEFTIN) 500 MG tablet TAKE 1 TAB BY MOUTH TWICE A DAY. STOP ON .      clonazePAM (KLONOPIN) 1 MG tablet Take 1 tablet (1 mg) by mouth 2 times daily as needed for anxiety (Patient taking differently: Take 1 mg by mouth 2 times daily as needed for anxiety. 5 mg) 20 tablet 0    DULoxetine (CYMBALTA) 60 MG capsule       ferrous sulfate  (FEROSUL) 325 (65 Fe) MG tablet Take 325 mg by mouth.      hydroCHLOROthiazide 12.5 MG tablet Take 1 tablet by mouth daily.      lovastatin (MEVACOR) 20 MG tablet Take 1 tablet (20 mg) by mouth At Bedtime 30 tablet 1    metFORMIN (GLUCOPHAGE) 500 MG tablet Take 1 tablet (500 mg) by mouth daily (with breakfast) 60 tablet     methocarbamol (ROBAXIN) 750 MG tablet TAKE 1 TAB BY MOUTH THREE TIMES DAILY AS NEEDED FOR 90 DAYS      acetaminophen (TYLENOL) 500 MG tablet       albuterol (2.5 MG/3ML) 0.083% neb solution Take 1 vial (2.5 mg) by nebulization every 6 hours as needed for shortness of breath / dyspnea or wheezing 360 mL     albuterol (ALBUTEROL) 108 (90 BASE) MCG/ACT Inhaler Inhale 2 puffs into the lungs every 6 hours      aspirin 81 MG chewable tablet Take 1 tablet (81 mg) by mouth daily 108 tablet 3    benzoyl peroxide 10 % topical gel Apply topically daily      blood glucose monitoring (ACCU-CHEK FASTCLIX) lancets       Blood Glucose Monitoring Suppl (TRUE METRIX AIR GLUCOSE METER) W/DEVICE KIT       cetirizine (ZYRTEC) 10 MG tablet Take 1 tablet (10 mg) by mouth every evening 30 tablet 1    COMPRESSION STOCKINGS       Cyanocobalamin (B-12) 1000 MCG SUBL Take 1 each by mouth.      donepezil (ARICEPT) 10 MG tablet Take 1 tablet (10 mg) by mouth daily 30 tablet 1    DULoxetine (CYMBALTA) 20 MG capsule Take 20 mg by mouth.      estradiol (ESTRACE) 0.1 MG/GM vaginal cream Place 1 g vaginally three times a week. Ok to use your fingers or the applicator 42.5 g 3    famotidine (PEPCID) 20 MG tablet Take 1 tablet by mouth daily.      furosemide (LASIX) 20 MG tablet Take 1 tablet by mouth daily.      gabapentin (NEURONTIN) 300 MG capsule TAKE 1 CAP BY MOUTH AT BEDTIME      glucagon (GLUCAGON EMERGENCY) 1 MG kit 1 mg      HYDROcodone-acetaminophen (NORCO) 7.5-325 MG per tablet maximum  tablet(s) per day 20 tablet 0    lidocaine (LIDODERM) 5 % Patch Place 1 patch onto the skin every 24 hours 30 patch     lisinopril  (PRINIVIL/ZESTRIL) 10 MG tablet Take 1 tablet (10 mg) by mouth daily 30 tablet 1    losartan (COZAAR) 100 MG tablet Take 1 tablet by mouth daily.      mirtazapine (REMERON) 30 MG tablet Take 1 tablet by mouth at bedtime.      mometasone (NASONEX) 50 MCG/ACT spray Spray 2 sprays into both nostrils daily 1 Box 11    mometasone (NASONEX) 50 MCG/ACT spray Spray 2 sprays into both nostrils daily 1 Box 11    montelukast (SINGULAIR) 10 MG tablet Take 1 tablet by mouth at bedtime.      PARoxetine (PAXIL) 20 MG tablet 1 in the morning and 2 in the evening 30 tablet 1    polyethylene glycol (MIRALAX/GLYCOLAX) powder Take 17 g by mouth      ranitidine (ZANTAC) 150 MG tablet Take 1 tablet (150 mg) by mouth 2 times daily 60 tablet 1    rOPINIRole (REQUIP) 0.5 MG tablet Take 0.5 mg by mouth.      senna (SENOKOT) 8.6 MG tablet Take 3 tablets by mouth At Bedtime 120 tablet     traZODone (DESYREL) 50 MG tablet Take 1 tablet (50 mg) by mouth nightly as needed for sleep 90 tablet 3    TRUEPLUS LANCETS 28G MISC 1 each      verapamil (CALAN-SR) 240 MG CR tablet Take 1 tablet (240 mg) by mouth daily 30 tablet 1     No current facility-administered medications for this visit.         Medical History:      Past Medical History:   Diagnosis Date    Anxiety 12/6/2016    Cataract right eye 12/6/2016    Cognitive disorder 2/2/2017    neuropsych 2017  IMPRESSIONS AND RECOMMENDATIONS  Current results indicate executive dysfunction, including difficulty with planning, organization, perseverations, and conceptualization. Information and psychomotor processing speed are slowed. Visual processing is impaired, and she demonstrates mild anomia. Learning and memory are impaired. She demonstrates a rapid forgetting rate. Basic attention    Concussion 12/5/2016    Depression 12/6/2016    Ear problem, right  - hole in the right ear 12/6/2016    Family history of diabetes mellitus 12/6/2016    Family history of obesity 12/6/2016    Family history of stress  12/6/2016    H/O cardiovascular stress test 12/6/2016 2016 October Outpatient Nuclear Stress Test10/14/2016  [facility]  Outpatient Nuclear Stress Test10/14/2016  [facility]  Result Transcription Jeannie Celis MD - 10/14/2016 11:59 PM CDTPJeannie shaw MD - 10/14/2016 11:59 PM CDT CC: [facility]   REPORT TITLE:  Nuclear Cardiology Final Report  Facility:  [facility]    H/O CT scan of head 12/6/2016    CT Head NON-Contrast4/16/2010  [facility]  Result Impression  1.  No acute findings noncontrast CT of the head.   2.  Appearance suggesting early periventricular small vessel ischemic  changes left frontal horn region.   Desmond Rosario M.D. Diagnostic/Musculoskeletal Radiologist [employer] SDS/sr D: 04/16/10  T: 04/17/10   Result Narrative CT     H/O magnetic resonance imaging of brain and brain stem 12/6/2016    Mild atrophy, mild white matter disease    H/O magnetic resonance imaging of lumbar spine 12/6/2016    MRI SPINE LUMBAR WO IV CONTRAST3/15/2016  [facility]  Result Impression IMPRESSION:   1.  There is a minimal disc bulging at L1-L2, L3-L4, and L4-L5.  No significant thecal sac or neuroforaminal narrowing and no evidence of nerve or impingement.  Result Narrative EXAM:  MRI LUMBAR SPINE WITHOUT CONTRAST  DATE:  3/15/2016 8:44 PM  ICD 10:                 ADDITIONAL CLINICAL DATA:  L    Headache 12/5/2016    History of MRI of thoracic spine 12/6/2016    MRI SPINE THORACIC W/O CONTR)3/15/2016  [facility]  Result Impression IMPRESSION:   1.  Benign hemangiomas, otherwise negative.  Result Narrative EXAM:  THORACIC SPINE WITHOUT GADOLINIUM  DATE:  3/15/2016 9:00 PM  ICD 10:               ADDITIONAL CLINICAL DATA:  Back pain.    COMPARISON:  None  TECHNIQUE:  Sagittal FLAIR T1, sagittal FSE T2, sagittal fat saturated FSE T2.  FINDINGS     History of stroke 2005 per patient 2016    Insomnia 2016    Laboratory test 2016    Osvaldo Andrew MD Boegel, Buddy MCKOY MD 2016           Narrative         Examination: Nuclear medicine DATscan for Dopamine Receptor Localization.    Examination: NM BRAIN IMAGING TOMOGRAPHIC (SPECT) DATSCAN  Date: 2016 7:46 AM  Indication: Parkinson's disease  Comparison: None  Additional Information: none  Interfering Medications: paroxetine  Technique:  The pat    Memory loss 2016    Parkinsonism (H) 2016    Post-trauma syndrome 2016    Urinary frequency 2016    Urinary urgency 2016     ROS  Social History    Social History     Socioeconomic History    Marital status: Single     Spouse name: Not on file    Number of children: Not on file    Years of education: Not on file    Highest education level: Not on file   Occupational History    Not on file   Tobacco Use    Smoking status: Former     Current packs/day: 0.00     Types: Cigarettes     Quit date: 3/27/1987     Years since quittin.8    Smokeless tobacco: Former   Substance and Sexual Activity    Alcohol use: Yes     Alcohol/week: 1.0 - 2.0 standard drink of alcohol     Types: 1 - 2 Standard drinks or equivalent per week     Comment: PER YEAR    Drug use: Not on file    Sexual activity: Not Currently   Other Topics Concern    Parent/sibling w/ CABG, MI or angioplasty before 65F 55M? Not Asked   Social History Narrative    SINGLE. LIVES IN [place].     2 twin daughters    3 sons     Social Drivers of Health     Financial Resource Strain: Not on file   Food Insecurity: Not on file   Transportation Needs: Not on file   Physical Activity: Not on file   Stress: Not on file   Social Connections: Not on file   Interpersonal Safety: Not At Risk (10/29/2024)    [facility]     Humiliation, Afraid, Rape, and Kick questionnaire     Fear of Current or Ex-Partner: No     Emotionally  Abused: No     Physically Abused: No     Sexually Abused: No   Housing Stability: Not on file       Family History  Family History   Problem Relation Age of Onset    Heart Disease Mother     Liver Cancer Father     Intellectual Disability Son     Obesity Son     Diabetes Son     Hypertension Other     Other - See Comments Daughter         gallbladder       Allergy    Allergies   Allergen Reactions    Ivp Dye [Contrast Dye] Hives    Latex Other (See Comments)     Other reaction(s): Hives  swelling    No Clinical Screening - See Comments      Adhesive Tape, IVP Dye    Seafood      Other reaction(s): Hives    Shellfish Allergy     Shellfish-Derived Products Hives     Scallops       Current Outpatient Medications   Medication Sig Dispense Refill    cefuroxime (CEFTIN) 500 MG tablet TAKE 1 TAB BY MOUTH TWICE A DAY. STOP ON 11/12.      clonazePAM (KLONOPIN) 1 MG tablet Take 1 tablet (1 mg) by mouth 2 times daily as needed for anxiety (Patient taking differently: Take 1 mg by mouth 2 times daily as needed for anxiety. 5 mg) 20 tablet 0    DULoxetine (CYMBALTA) 60 MG capsule       ferrous sulfate (FEROSUL) 325 (65 Fe) MG tablet Take 325 mg by mouth.      hydroCHLOROthiazide 12.5 MG tablet Take 1 tablet by mouth daily.      lovastatin (MEVACOR) 20 MG tablet Take 1 tablet (20 mg) by mouth At Bedtime 30 tablet 1    metFORMIN (GLUCOPHAGE) 500 MG tablet Take 1 tablet (500 mg) by mouth daily (with breakfast) 60 tablet     methocarbamol (ROBAXIN) 750 MG tablet TAKE 1 TAB BY MOUTH THREE TIMES DAILY AS NEEDED FOR 90 DAYS      acetaminophen (TYLENOL) 500 MG tablet       albuterol (2.5 MG/3ML) 0.083% neb solution Take 1 vial (2.5 mg) by nebulization every 6 hours as needed for shortness of breath / dyspnea or wheezing 360 mL     albuterol (ALBUTEROL) 108 (90 BASE) MCG/ACT Inhaler Inhale 2 puffs into the lungs every 6 hours      aspirin 81 MG chewable tablet Take 1 tablet (81 mg) by mouth daily 108 tablet 3    benzoyl peroxide 10 %  topical gel Apply topically daily      blood glucose monitoring (ACCU-CHEK FASTCLIX) lancets       Blood Glucose Monitoring Suppl (TRUE METRIX AIR GLUCOSE METER) W/DEVICE KIT       cetirizine (ZYRTEC) 10 MG tablet Take 1 tablet (10 mg) by mouth every evening 30 tablet 1    COMPRESSION STOCKINGS       Cyanocobalamin (B-12) 1000 MCG SUBL Take 1 each by mouth.      donepezil (ARICEPT) 10 MG tablet Take 1 tablet (10 mg) by mouth daily 30 tablet 1    DULoxetine (CYMBALTA) 20 MG capsule Take 20 mg by mouth.      estradiol (ESTRACE) 0.1 MG/GM vaginal cream Place 1 g vaginally three times a week. Ok to use your fingers or the applicator 42.5 g 3    famotidine (PEPCID) 20 MG tablet Take 1 tablet by mouth daily.      furosemide (LASIX) 20 MG tablet Take 1 tablet by mouth daily.      gabapentin (NEURONTIN) 300 MG capsule TAKE 1 CAP BY MOUTH AT BEDTIME      glucagon (GLUCAGON EMERGENCY) 1 MG kit 1 mg      HYDROcodone-acetaminophen (NORCO) 7.5-325 MG per tablet maximum  tablet(s) per day 20 tablet 0    lidocaine (LIDODERM) 5 % Patch Place 1 patch onto the skin every 24 hours 30 patch     lisinopril (PRINIVIL/ZESTRIL) 10 MG tablet Take 1 tablet (10 mg) by mouth daily 30 tablet 1    losartan (COZAAR) 100 MG tablet Take 1 tablet by mouth daily.      mirtazapine (REMERON) 30 MG tablet Take 1 tablet by mouth at bedtime.      mometasone (NASONEX) 50 MCG/ACT spray Spray 2 sprays into both nostrils daily 1 Box 11    mometasone (NASONEX) 50 MCG/ACT spray Spray 2 sprays into both nostrils daily 1 Box 11    montelukast (SINGULAIR) 10 MG tablet Take 1 tablet by mouth at bedtime.      oxyBUTYnin (DITROPAN) 5 MG tablet Take 1 tablet by mouth at bedtime.      PARoxetine (PAXIL) 20 MG tablet 1 in the morning and 2 in the evening 30 tablet 1    polyethylene glycol (MIRALAX/GLYCOLAX) powder Take 17 g by mouth      ranitidine (ZANTAC) 150 MG tablet Take 1 tablet (150 mg) by mouth 2 times daily 60 tablet 1    rOPINIRole (REQUIP) 0.5 MG tablet Take  0.5 mg by mouth.      senna (SENOKOT) 8.6 MG tablet Take 3 tablets by mouth At Bedtime 120 tablet     traZODone (DESYREL) 50 MG tablet Take 1 tablet (50 mg) by mouth nightly as needed for sleep 90 tablet 3    TRUEPLUS LANCETS 28G MISC 1 each      verapamil (CALAN-SR) 240 MG CR tablet Take 1 tablet (240 mg) by mouth daily 30 tablet 1     No current facility-administered medications for this visit.       Objective:   Deferred ( virtual visit)     Asssessment and plan  Encounter Diagnosis   Name Primary?    Urinary leakage      Discontinue oxybutine as this is not helping but also probably not a great choice at her age  Will try vesicare for now, and consider mirabegron if this doesn't work  Follow up virtually as needed           I spent a total of 20 minutes on  Video with  Hannah Choudhary on the date of the encounter in chart review, patient visit, review of tests, documentation and/or discussion with other providers about the issues documented above.

## 2025-01-28 NOTE — LETTER
2025       RE: Hannah Choudhary  [address] [place] [address]     Dear Colleague,    Thank you for referring your patient, Hannah Choudhary, to the [facility] [place] [facility]. Please see a copy of my visit note below.    Virtual Visit Details    Type of service:  Video Visit   Video Start Time: 4:01 PM  Video End Time:4:27 PM    Originating Location (pt. Location): Home    Distant Location (provider location):  On-site  Platform used for Video Visit:[employer]      Chief complaint: Urinary incontinence    Subjective     Hannah Choudhary is a 74 year old    with med hx sig for stroke, parkinsonism, anxiety, depression and post trauma syndrome who presents for a video visit today to follow up for urgency urinary incontinence. She wears 1-2  incontinence pull ups a day. She takes it once a day ( 5 mg ) every evening. She had renal calculus which was removed on 24 ( Cystoscopy, left stent exchange, left ureteroscopy with holmium laser lithotripsy and stone fragment extraction  on 24). Stents have Today she says she is doing well from that, just small blood from vaginal vs urine . She is s/p hysterectomy so no uterus.       Surgical History:      Past Surgical History:   Procedure Laterality Date     HYSTERECTOMY      partial hysterectomy     MAMMOPLASTY REDUCTION      x2     right arm surgery Right     fracture of right arm     TONSILLECTOMY      x2     TUBAL LIGATION         OB/Gyn History:  OB History    Para Term  AB Living   6 6 0 0 0 0   SAB IAB Ectopic Multiple Live Births   0 0 0 0 0      # Outcome Date GA Lbr Ronak/2nd Weight Sex Type Anes PTL Lv   6 Para            5 Para            4 Para            3 Para            2 Para            1 Para                Medications/Vitamins/Supplements:   Current Outpatient Medications   Medication Sig Dispense Refill     cefuroxime (CEFTIN) 500 MG tablet TAKE 1  TAB BY MOUTH TWICE A DAY. STOP ON 11/12.       clonazePAM (KLONOPIN) 1 MG tablet Take 1 tablet (1 mg) by mouth 2 times daily as needed for anxiety (Patient taking differently: Take 1 mg by mouth 2 times daily as needed for anxiety. 5 mg) 20 tablet 0     DULoxetine (CYMBALTA) 60 MG capsule        ferrous sulfate (FEROSUL) 325 (65 Fe) MG tablet Take 325 mg by mouth.       hydroCHLOROthiazide 12.5 MG tablet Take 1 tablet by mouth daily.       lovastatin (MEVACOR) 20 MG tablet Take 1 tablet (20 mg) by mouth At Bedtime 30 tablet 1     metFORMIN (GLUCOPHAGE) 500 MG tablet Take 1 tablet (500 mg) by mouth daily (with breakfast) 60 tablet      methocarbamol (ROBAXIN) 750 MG tablet TAKE 1 TAB BY MOUTH THREE TIMES DAILY AS NEEDED FOR 90 DAYS       acetaminophen (TYLENOL) 500 MG tablet        albuterol (2.5 MG/3ML) 0.083% neb solution Take 1 vial (2.5 mg) by nebulization every 6 hours as needed for shortness of breath / dyspnea or wheezing 360 mL      albuterol (ALBUTEROL) 108 (90 BASE) MCG/ACT Inhaler Inhale 2 puffs into the lungs every 6 hours       aspirin 81 MG chewable tablet Take 1 tablet (81 mg) by mouth daily 108 tablet 3     benzoyl peroxide 10 % topical gel Apply topically daily       blood glucose monitoring (ACCU-CHEK FASTCLIX) lancets        Blood Glucose Monitoring Suppl (TRUE METRIX AIR GLUCOSE METER) W/DEVICE KIT        cetirizine (ZYRTEC) 10 MG tablet Take 1 tablet (10 mg) by mouth every evening 30 tablet 1     COMPRESSION STOCKINGS        Cyanocobalamin (B-12) 1000 MCG SUBL Take 1 each by mouth.       donepezil (ARICEPT) 10 MG tablet Take 1 tablet (10 mg) by mouth daily 30 tablet 1     DULoxetine (CYMBALTA) 20 MG capsule Take 20 mg by mouth.       estradiol (ESTRACE) 0.1 MG/GM vaginal cream Place 1 g vaginally three times a week. Ok to use your fingers or the applicator 42.5 g 3     famotidine (PEPCID) 20 MG tablet Take 1 tablet by mouth daily.       furosemide (LASIX) 20 MG tablet Take 1 tablet by mouth  daily.       gabapentin (NEURONTIN) 300 MG capsule TAKE 1 CAP BY MOUTH AT BEDTIME       glucagon (GLUCAGON EMERGENCY) 1 MG kit 1 mg       HYDROcodone-acetaminophen (NORCO) 7.5-325 MG per tablet maximum  tablet(s) per day 20 tablet 0     lidocaine (LIDODERM) 5 % Patch Place 1 patch onto the skin every 24 hours 30 patch      lisinopril (PRINIVIL/ZESTRIL) 10 MG tablet Take 1 tablet (10 mg) by mouth daily 30 tablet 1     losartan (COZAAR) 100 MG tablet Take 1 tablet by mouth daily.       mirtazapine (REMERON) 30 MG tablet Take 1 tablet by mouth at bedtime.       mometasone (NASONEX) 50 MCG/ACT spray Spray 2 sprays into both nostrils daily 1 Box 11     mometasone (NASONEX) 50 MCG/ACT spray Spray 2 sprays into both nostrils daily 1 Box 11     montelukast (SINGULAIR) 10 MG tablet Take 1 tablet by mouth at bedtime.       PARoxetine (PAXIL) 20 MG tablet 1 in the morning and 2 in the evening 30 tablet 1     polyethylene glycol (MIRALAX/GLYCOLAX) powder Take 17 g by mouth       ranitidine (ZANTAC) 150 MG tablet Take 1 tablet (150 mg) by mouth 2 times daily 60 tablet 1     rOPINIRole (REQUIP) 0.5 MG tablet Take 0.5 mg by mouth.       senna (SENOKOT) 8.6 MG tablet Take 3 tablets by mouth At Bedtime 120 tablet      traZODone (DESYREL) 50 MG tablet Take 1 tablet (50 mg) by mouth nightly as needed for sleep 90 tablet 3     TRUEPLUS LANCETS 28G MISC 1 each       verapamil (CALAN-SR) 240 MG CR tablet Take 1 tablet (240 mg) by mouth daily 30 tablet 1     No current facility-administered medications for this visit.         Medical History:      Past Medical History:   Diagnosis Date     Anxiety 12/6/2016     Cataract right eye 12/6/2016     Cognitive disorder 2/2/2017    neuropsych 2017  IMPRESSIONS AND RECOMMENDATIONS  Current results indicate executive dysfunction, including difficulty with planning, organization, perseverations, and conceptualization. Information and psychomotor processing speed are slowed. Visual processing is  impaired, and she demonstrates mild anomia. Learning and memory are impaired. She demonstrates a rapid forgetting rate. Basic attention     Concussion 12/5/2016     Depression 12/6/2016     Ear problem, right  - hole in the right ear 12/6/2016     Family history of diabetes mellitus 12/6/2016     Family history of obesity 12/6/2016     Family history of stress 12/6/2016     H/O cardiovascular stress test 12/6/2016 2016 October Outpatient Nuclear Stress Test10/14/2016  [facility]  Outpatient Nuclear Stress Test10/14/2016  [facility]  Result Transcription Jeannie Celis MD - 10/14/2016 11:59 PM CDTPJeannie shaw MD - 10/14/2016 11:59 PM CDT CC: [facility] [place]   REPORT TITLE:  Nuclear Cardiology Final Report  Facility:  [facility]     H/O CT scan of head 12/6/2016    CT Head NON-Contrast4/16/2010  [facility]  Result Impression  1.  No acute findings noncontrast CT of the head.   2.  Appearance suggesting early periventricular small vessel ischemic  changes left frontal horn region.   Desmond Rosario M.D. Diagnostic/Musculoskeletal Radiologist [facility] SDS/sr D: 04/16/10  T: 04/17/10   Result Narrative CT      H/O magnetic resonance imaging of brain and brain stem 12/6/2016    Mild atrophy, mild white matter disease     H/O magnetic resonance imaging of lumbar spine 12/6/2016    MRI SPINE LUMBAR WO IV CONTRAST3/15/2016  [facility]  Result Impression IMPRESSION:   1.  There is a minimal disc bulging at L1-L2, L3-L4, and L4-L5.  No significant thecal sac or neuroforaminal narrowing and no evidence of nerve or impingement.  Result Narrative EXAM:  MRI LUMBAR SPINE WITHOUT CONTRAST  DATE:  3/15/2016 8:44 PM  ICD 10:                 ADDITIONAL CLINICAL DATA:  L     Headache 12/5/2016     History of MRI of thoracic spine 12/6/2016    MRI SPINE THORACIC W/O CONTR)3/15/2016   [facility]  Result Impression IMPRESSION:   1.  Benign hemangiomas, otherwise negative.  Result Narrative EXAM:  THORACIC SPINE WITHOUT GADOLINIUM  DATE:  3/15/2016 9:00 PM  ICD 10:               ADDITIONAL CLINICAL DATA:  Back pain.    COMPARISON:  None  TECHNIQUE:  Sagittal FLAIR T1, sagittal FSE T2, sagittal fat saturated FSE T2.  FINDINGS     History of stroke 2005 per patient 2016     Insomnia 2016     Laboratory test 2016    Osvaldo Andrew MD Boegel, Buddy MCKOY MD 2016           Narrative         Examination: Nuclear medicine DATscan for Dopamine Receptor Localization.    Examination: NM BRAIN IMAGING TOMOGRAPHIC (SPECT) DATSCAN  Date: 2016 7:46 AM  Indication: Parkinson's disease  Comparison: None  Additional Information: none  Interfering Medications: paroxetine  Technique:  The pat     Memory loss 2016     Parkinsonism (H) 2016     Post-trauma syndrome 2016     Urinary frequency 2016     Urinary urgency 2016     ROS  Social History    Social History     Socioeconomic History     Marital status: Single     Spouse name: Not on file     Number of children: Not on file     Years of education: Not on file     Highest education level: Not on file   Occupational History     Not on file   Tobacco Use     Smoking status: Former     Current packs/day: 0.00     Types: Cigarettes     Quit date: 3/27/1987     Years since quittin.8     Smokeless tobacco: Former   Substance and Sexual Activity     Alcohol use: Yes     Alcohol/week: 1.0 - 2.0 standard drink of alcohol     Types: 1 - 2 Standard drinks or equivalent per week     Comment: PER YEAR     Drug use: Not on file     Sexual activity: Not Currently   Other Topics Concern     Parent/sibling w/ CABG, MI or angioplasty before 65F 55M? Not Asked   Social History Narrative    SINGLE. LIVES IN [place].     2 twin daughters    3 sons     Social Drivers of Health     Financial  Resource Strain: Not on file   Food Insecurity: Not on file   Transportation Needs: Not on file   Physical Activity: Not on file   Stress: Not on file   Social Connections: Not on file   Interpersonal Safety: Not At Risk (10/29/2024)    Received from [facility]     Humiliation, Afraid, Rape, and Kick questionnaire      Fear of Current or Ex-Partner: No      Emotionally Abused: No      Physically Abused: No      Sexually Abused: No   Housing Stability: Not on file       Family History  Family History   Problem Relation Age of Onset     Heart Disease Mother      Liver Cancer Father      Intellectual Disability Son      Obesity Son      Diabetes Son      Hypertension Other      Other - See Comments Daughter         gallbladder       Allergy    Allergies   Allergen Reactions     Ivp Dye [Contrast Dye] Hives     Latex Other (See Comments)     Other reaction(s): Hives  swelling     No Clinical Screening - See Comments      Adhesive Tape, IVP Dye     Seafood      Other reaction(s): Hives     Shellfish Allergy      Shellfish-Derived Products Hives     Scallops       Current Outpatient Medications   Medication Sig Dispense Refill     cefuroxime (CEFTIN) 500 MG tablet TAKE 1 TAB BY MOUTH TWICE A DAY. STOP ON 11/12.       clonazePAM (KLONOPIN) 1 MG tablet Take 1 tablet (1 mg) by mouth 2 times daily as needed for anxiety (Patient taking differently: Take 1 mg by mouth 2 times daily as needed for anxiety. 5 mg) 20 tablet 0     DULoxetine (CYMBALTA) 60 MG capsule        ferrous sulfate (FEROSUL) 325 (65 Fe) MG tablet Take 325 mg by mouth.       hydroCHLOROthiazide 12.5 MG tablet Take 1 tablet by mouth daily.       lovastatin (MEVACOR) 20 MG tablet Take 1 tablet (20 mg) by mouth At Bedtime 30 tablet 1     metFORMIN (GLUCOPHAGE) 500 MG tablet Take 1 tablet (500 mg) by mouth daily (with breakfast) 60 tablet      methocarbamol (ROBAXIN) 750 MG tablet TAKE 1 TAB BY MOUTH THREE TIMES DAILY AS NEEDED FOR 90 DAYS        acetaminophen (TYLENOL) 500 MG tablet        albuterol (2.5 MG/3ML) 0.083% neb solution Take 1 vial (2.5 mg) by nebulization every 6 hours as needed for shortness of breath / dyspnea or wheezing 360 mL      albuterol (ALBUTEROL) 108 (90 BASE) MCG/ACT Inhaler Inhale 2 puffs into the lungs every 6 hours       aspirin 81 MG chewable tablet Take 1 tablet (81 mg) by mouth daily 108 tablet 3     benzoyl peroxide 10 % topical gel Apply topically daily       blood glucose monitoring (ACCU-CHEK FASTCLIX) lancets        Blood Glucose Monitoring Suppl (TRUE METRIX AIR GLUCOSE METER) W/DEVICE KIT        cetirizine (ZYRTEC) 10 MG tablet Take 1 tablet (10 mg) by mouth every evening 30 tablet 1     COMPRESSION STOCKINGS        Cyanocobalamin (B-12) 1000 MCG SUBL Take 1 each by mouth.       donepezil (ARICEPT) 10 MG tablet Take 1 tablet (10 mg) by mouth daily 30 tablet 1     DULoxetine (CYMBALTA) 20 MG capsule Take 20 mg by mouth.       estradiol (ESTRACE) 0.1 MG/GM vaginal cream Place 1 g vaginally three times a week. Ok to use your fingers or the applicator 42.5 g 3     famotidine (PEPCID) 20 MG tablet Take 1 tablet by mouth daily.       furosemide (LASIX) 20 MG tablet Take 1 tablet by mouth daily.       gabapentin (NEURONTIN) 300 MG capsule TAKE 1 CAP BY MOUTH AT BEDTIME       glucagon (GLUCAGON EMERGENCY) 1 MG kit 1 mg       HYDROcodone-acetaminophen (NORCO) 7.5-325 MG per tablet maximum  tablet(s) per day 20 tablet 0     lidocaine (LIDODERM) 5 % Patch Place 1 patch onto the skin every 24 hours 30 patch      lisinopril (PRINIVIL/ZESTRIL) 10 MG tablet Take 1 tablet (10 mg) by mouth daily 30 tablet 1     losartan (COZAAR) 100 MG tablet Take 1 tablet by mouth daily.       mirtazapine (REMERON) 30 MG tablet Take 1 tablet by mouth at bedtime.       mometasone (NASONEX) 50 MCG/ACT spray Spray 2 sprays into both nostrils daily 1 Box 11     mometasone (NASONEX) 50 MCG/ACT spray Spray 2 sprays into both nostrils daily 1 Box 11      montelukast (SINGULAIR) 10 MG tablet Take 1 tablet by mouth at bedtime.       oxyBUTYnin (DITROPAN) 5 MG tablet Take 1 tablet by mouth at bedtime.       PARoxetine (PAXIL) 20 MG tablet 1 in the morning and 2 in the evening 30 tablet 1     polyethylene glycol (MIRALAX/GLYCOLAX) powder Take 17 g by mouth       ranitidine (ZANTAC) 150 MG tablet Take 1 tablet (150 mg) by mouth 2 times daily 60 tablet 1     rOPINIRole (REQUIP) 0.5 MG tablet Take 0.5 mg by mouth.       senna (SENOKOT) 8.6 MG tablet Take 3 tablets by mouth At Bedtime 120 tablet      traZODone (DESYREL) 50 MG tablet Take 1 tablet (50 mg) by mouth nightly as needed for sleep 90 tablet 3     TRUEPLUS LANCETS 28G MISC 1 each       verapamil (CALAN-SR) 240 MG CR tablet Take 1 tablet (240 mg) by mouth daily 30 tablet 1     No current facility-administered medications for this visit.       Objective:   Deferred ( virtual visit)     Asssessment and plan  Encounter Diagnosis   Name Primary?     Urinary leakage      Discontinue oxybutine as this is not helping but also probably not a great choice at her age  Will try vesicare for now, and consider mirabegron if this doesn't work  Follow up virtually as needed           I spent a total of 20 minutes on  Video with  Hannah Choudhary on the date of the encounter in chart review, patient visit, review of tests, documentation and/or discussion with other providers about the issues documented above.       Again, thank you for allowing me to participate in the care of your patient.      Sincerely,    Bri Rodriguez MD

## (undated) RX ORDER — LIDOCAINE HYDROCHLORIDE 20 MG/ML
JELLY TOPICAL
Status: DISPENSED
Start: 2024-09-13